# Patient Record
Sex: MALE | Race: WHITE | NOT HISPANIC OR LATINO | Employment: OTHER | URBAN - METROPOLITAN AREA
[De-identification: names, ages, dates, MRNs, and addresses within clinical notes are randomized per-mention and may not be internally consistent; named-entity substitution may affect disease eponyms.]

---

## 2017-01-14 ENCOUNTER — OFFICE VISIT (OUTPATIENT)
Dept: FAMILY MEDICINE CLINIC | Facility: CLINIC | Age: 62
End: 2017-01-14

## 2017-01-14 VITALS
HEIGHT: 67 IN | HEART RATE: 82 BPM | TEMPERATURE: 99.3 F | RESPIRATION RATE: 18 BRPM | BODY MASS INDEX: 30.45 KG/M2 | SYSTOLIC BLOOD PRESSURE: 136 MMHG | DIASTOLIC BLOOD PRESSURE: 86 MMHG | WEIGHT: 194 LBS

## 2017-01-14 DIAGNOSIS — J40 BRONCHITIS: Primary | ICD-10-CM

## 2017-01-14 PROCEDURE — 99213 OFFICE O/P EST LOW 20 MIN: CPT | Performed by: FAMILY MEDICINE

## 2017-01-14 RX ORDER — AMOXICILLIN AND CLAVULANATE POTASSIUM 875; 125 MG/1; MG/1
1 TABLET, FILM COATED ORAL 2 TIMES DAILY
Qty: 20 TABLET | Refills: 0 | Status: SHIPPED | OUTPATIENT
Start: 2017-01-14 | End: 2017-01-24

## 2017-01-14 RX ORDER — BUDESONIDE AND FORMOTEROL FUMARATE DIHYDRATE 160; 4.5 UG/1; UG/1
2 AEROSOL RESPIRATORY (INHALATION)
Qty: 1 INHALER | Refills: 0 | Status: SHIPPED | OUTPATIENT
Start: 2017-01-14 | End: 2017-02-06

## 2017-01-14 RX ORDER — ALBUTEROL SULFATE 90 UG/1
2 AEROSOL, METERED RESPIRATORY (INHALATION) EVERY 4 HOURS PRN
Qty: 1 INHALER | Refills: 0 | Status: SHIPPED | OUTPATIENT
Start: 2017-01-14 | End: 2017-02-06

## 2017-01-14 NOTE — MR AVS SNAPSHOT
Mack Gamez   1/14/2017 8:50 AM   Office Visit    Provider:  Rizwan Lobato MD   Department:  [facility] FAMILY MEDICINE   Dept Phone:  [phone]                Your Full Care Plan              Today's Medication Changes          These changes are accurate as of: 1/14/17  9:23 AM.  If you have any questions, ask your nurse or doctor.               New Medication(s)Ordered:     albuterol 108 (90 BASE) MCG/ACT inhaler   Commonly known as:  PROVENTIL HFA;VENTOLIN HFA   Inhale 2 puffs Every 4 (Four) Hours As Needed for wheezing.   Started by:  Rizwan Lobato MD       amoxicillin-clavulanate 875-125 MG per tablet   Commonly known as:  AUGMENTIN   Take 1 tablet by mouth 2 (Two) Times a Day for 10 days.   Started by:  Rizwan Lobato MD       budesonide-formoterol 160-4.5 MCG/ACT inhaler   Commonly known as:  SYMBICORT   Inhale 2 puffs 2 (Two) Times a Day.   Started by:  Rizwan Lobato MD       Chlorcyclizine-Pseudoephed 25-60 MG tablet   Commonly known as:  STAHIST AD   Take 1/2 to 1 tab po every 12 hours PRN congestion   Started by:  Rizwan Lobato MD            Where to Get Your Medications      These medications were sent to [facility] - [place], [place] - [address] [place] - [phone]  - [phone] FX  [address] [place] [id number]    Hours:  24-hours Phone:  [phone]     albuterol 108 (90 BASE) MCG/ACT inhaler    amoxicillin-clavulanate 875-125 MG per tablet    budesonide-formoterol 160-4.5 MCG/ACT inhaler    Chlorcyclizine-Pseudoephed 25-60 MG tablet                  Your Updated Medication List          This list is accurate as of: 1/14/17  9:23 AM.  Always use your most recent med list.                albuterol 108 (90 BASE) MCG/ACT inhaler   Commonly known as:  PROVENTIL HFA;VENTOLIN HFA   Inhale 2 puffs Every 4 (Four) Hours As Needed for wheezing.       amLODIPine 10 MG tablet   Commonly known as:   NORVASC   Take 1 tablet by mouth Daily.       amoxicillin-clavulanate 875-125 MG per tablet   Commonly known as:  AUGMENTIN   Take 1 tablet by mouth 2 (Two) Times a Day for 10 days.       atorvastatin 80 MG tablet   Commonly known as:  LIPITOR   Take 1 tablet by mouth Every Night.       budesonide-formoterol 160-4.5 MCG/ACT inhaler   Commonly known as:  SYMBICORT   Inhale 2 puffs 2 (Two) Times a Day.       carvedilol 12.5 MG tablet   Commonly known as:  COREG   Take 1 tablet by mouth Every 12 (Twelve) Hours.       Chlorcyclizine-Pseudoephed 25-60 MG tablet   Commonly known as:  STAHIST AD   Take 1/2 to 1 tab po every 12 hours PRN congestion       clopidogrel 75 MG tablet   Commonly known as:  PLAVIX   Take 1 tablet by mouth Daily.       cyclobenzaprine 10 MG tablet   Commonly known as:  FLEXERIL   Take 1 tablet by mouth 3 (three) times a day as needed for muscle spasms.       furosemide 20 MG tablet   Commonly known as:  LASIX   Take 1 tablet by mouth Daily.       hydrOXYzine 25 MG tablet   Commonly known as:  ATARAX   take 1 tablet by mouth at bedtime if needed       ibuprofen 200 MG tablet   Commonly known as:  ADVIL,MOTRIN       lisinopril 40 MG tablet   Commonly known as:  PRINIVIL,ZESTRIL   take 1 tablet by mouth once daily       Morphine 60 MG 12 hr tablet   Commonly known as:  MS CONTIN       NUVIGIL 250 MG tablet   Generic drug:  Armodafinil       RA ASPIRIN EC 81 MG EC tablet   Generic drug:  aspirin   take 1 tablet by mouth once daily               You Were Diagnosed With        Codes Comments    Bronchitis    -  Primary ICD-10-CM: J40  ICD-9-CM: 490       Instructions     None    Patient Instructions History      MyChart Signup     Our records indicate that your [employer] account has been deactivated. If you would like to reactivate your account, please email [email or web address] or call [phone] to talk to our [employer] staff.             Other Info from Your Visit           Your  "Appointments     May 01, 2017  9:30 AM EDT   Follow Up with Paolo Moreno MD   [facility] CARDIOLOGY (--)    [address]  [facility] [address] [phone]           Arrive 15 minutes prior to appointment.              Allergies     No Known Drug Allergy        Vital Signs     Blood Pressure Pulse Temperature Respirations Height Weight    136/86 82 99.3 °F (37.4 °C) 18 67\" (170.2 cm) 194 lb (88 kg)    Body Mass Index Smoking Status                30.38 kg/m2 Never Smoker          Problems and Diagnoses Noted     Bronchitis    -  Primary      No Longer an Issue     Abnormal EKG    Hip pain    Chest pain at rest    Chronic neck pain    Coronary artery disease of native artery of native heart with stable angina pectoris    High cholesterol or triglycerides    High blood pressure    Difficulty falling or staying asleep    Primary narcolepsy without cataplexy    Proctitis    Shortness of breath at rest    Sleep apnea    Fast heart beat    Vitamin D deficiency      "

## 2017-01-14 NOTE — PROGRESS NOTES
Selene Gamez is a 61 y.o. male.     History of Present Illness   The patient describes several days of runny nose and congestion.  It seems to be not improving with home care.  The patient reports associated sinus drainage and scratchy throat.  The patient is now experiencing an intermittent croupy cough with occasional production.  There is no fever, chills or acute dyspnea.    Review of Systems   Constitutional: Negative for chills and fever.   HENT: Positive for congestion and postnasal drip. Negative for ear pain, facial swelling, sinus pressure and sore throat.    Eyes: Negative for discharge.   Respiratory: Positive for cough and wheezing. Negative for shortness of breath.    Cardiovascular: Negative for chest pain, palpitations and leg swelling.   Gastrointestinal: Negative for diarrhea, nausea and vomiting.   Skin: Negative for rash.   Hematological: Does not bruise/bleed easily.     Objective   Physical Exam   Constitutional: He is oriented to person, place, and time. He appears well-developed and well-nourished.   HENT:   Head: Normocephalic and atraumatic.   Right Ear: Hearing, tympanic membrane, external ear and ear canal normal.   Left Ear: Hearing, tympanic membrane, external ear and ear canal normal.   Nose: Mucosal edema and rhinorrhea present.   Mouth/Throat: Uvula is midline. Posterior oropharyngeal erythema present.   Eyes: Conjunctivae are normal. Left eye exhibits no discharge.   Neck: Neck supple.   Cardiovascular: Normal rate, regular rhythm and normal heart sounds.    Pulmonary/Chest: Effort normal. He has wheezes (bronchial).   Musculoskeletal: Normal range of motion.   Lymphadenopathy:     He has no cervical adenopathy.   Neurological: He is alert and oriented to person, place, and time.   Skin: Skin is warm. No rash noted.   Psychiatric: He has a normal mood and affect.   Vitals reviewed.    Assessment/Plan   Diagnoses and all orders for this visit:    Bronchitis  -      albuterol  (90 BASE) MCG/ACT inhaler; Inhale 2 puffs Every 4 (Four) Hours As Needed for wheezing.  -     budesonide-formoterol (SYMBICORT) 160-4.5 MCG/ACT inhaler; Inhale 2 puffs 2 (Two) Times a Day.  -     Chlorcyclizine-Pseudoephed (STAHIST AD) 25-60 MG tablet; Take 1/2 to 1 tab po every 12 hours PRN congestion  -     amoxicillin-clavulanate (AUGMENTIN) 875-125 MG per tablet; Take 1 tablet by mouth 2 (Two) Times a Day for 10 days.  - Rest, fluids, avoid sick contacts and RTC if not improved.

## 2017-01-29 ENCOUNTER — APPOINTMENT (OUTPATIENT)
Dept: GENERAL RADIOLOGY | Facility: HOSPITAL | Age: 62
End: 2017-01-29

## 2017-01-29 ENCOUNTER — APPOINTMENT (OUTPATIENT)
Dept: CT IMAGING | Facility: HOSPITAL | Age: 62
End: 2017-01-29

## 2017-01-29 ENCOUNTER — HOSPITAL ENCOUNTER (EMERGENCY)
Facility: HOSPITAL | Age: 62
Discharge: HOME | End: 2017-01-29
Attending: EMERGENCY MEDICINE | Admitting: EMERGENCY MEDICINE

## 2017-01-29 VITALS
RESPIRATION RATE: 18 BRPM | WEIGHT: 182 LBS | BODY MASS INDEX: 28.56 KG/M2 | HEIGHT: 67 IN | TEMPERATURE: 98.7 F | DIASTOLIC BLOOD PRESSURE: 101 MMHG | OXYGEN SATURATION: 98 % | HEART RATE: 87 BPM | SYSTOLIC BLOOD PRESSURE: 159 MMHG

## 2017-01-29 DIAGNOSIS — I10 UNCONTROLLED HYPERTENSION: ICD-10-CM

## 2017-01-29 DIAGNOSIS — I20.9 ANGINA PECTORIS (HCC): Primary | ICD-10-CM

## 2017-01-29 LAB
ALBUMIN SERPL-MCNC: 4.4 G/DL (ref 3.2–4.8)
ALBUMIN/GLOB SERPL: 1.6 G/DL (ref 1.5–2.5)
ALP SERPL-CCNC: 94 U/L (ref 25–100)
ALT SERPL W P-5'-P-CCNC: 32 U/L (ref 7–40)
ANION GAP SERPL CALCULATED.3IONS-SCNC: 6 MMOL/L (ref 3–11)
AST SERPL-CCNC: 24 U/L (ref 0–33)
BASOPHILS # BLD AUTO: 0 10*3/MM3 (ref 0–0.2)
BASOPHILS NFR BLD AUTO: 0 % (ref 0–1)
BILIRUB SERPL-MCNC: 1.9 MG/DL (ref 0.3–1.2)
BNP SERPL-MCNC: 28 PG/ML (ref 0–100)
BUN BLD-MCNC: 14 MG/DL (ref 9–23)
BUN/CREAT SERPL: 23.3 (ref 7–25)
CALCIUM SPEC-SCNC: 9.3 MG/DL (ref 8.7–10.4)
CHLORIDE SERPL-SCNC: 106 MMOL/L (ref 99–109)
CO2 SERPL-SCNC: 28 MMOL/L (ref 20–31)
CREAT BLD-MCNC: 0.6 MG/DL (ref 0.6–1.3)
DEPRECATED RDW RBC AUTO: 47.4 FL (ref 37–54)
EOSINOPHIL # BLD AUTO: 0.12 10*3/MM3 (ref 0.1–0.3)
EOSINOPHIL NFR BLD AUTO: 1.1 % (ref 0–3)
ERYTHROCYTE [DISTWIDTH] IN BLOOD BY AUTOMATED COUNT: 14.3 % (ref 11.3–14.5)
GFR SERPL CREATININE-BSD FRML MDRD: 138 ML/MIN/1.73
GLOBULIN UR ELPH-MCNC: 2.8 GM/DL
GLUCOSE BLD-MCNC: 118 MG/DL (ref 70–100)
HCT VFR BLD AUTO: 45.3 % (ref 38.9–50.9)
HGB BLD-MCNC: 15.5 G/DL (ref 13.1–17.5)
HOLD SPECIMEN: NORMAL
HOLD SPECIMEN: NORMAL
IMM GRANULOCYTES # BLD: 0.03 10*3/MM3 (ref 0–0.03)
IMM GRANULOCYTES NFR BLD: 0.3 % (ref 0–0.6)
LIPASE SERPL-CCNC: 24 U/L (ref 6–51)
LYMPHOCYTES # BLD AUTO: 0.99 10*3/MM3 (ref 0.6–4.8)
LYMPHOCYTES NFR BLD AUTO: 9.2 % (ref 24–44)
MCH RBC QN AUTO: 31 PG (ref 27–31)
MCHC RBC AUTO-ENTMCNC: 34.2 G/DL (ref 32–36)
MCV RBC AUTO: 90.6 FL (ref 80–99)
MONOCYTES # BLD AUTO: 0.75 10*3/MM3 (ref 0–1)
MONOCYTES NFR BLD AUTO: 7 % (ref 0–12)
NEUTROPHILS # BLD AUTO: 8.9 10*3/MM3 (ref 1.5–8.3)
NEUTROPHILS NFR BLD AUTO: 82.4 % (ref 41–71)
PLATELET # BLD AUTO: 215 10*3/MM3 (ref 150–450)
PMV BLD AUTO: 9.9 FL (ref 6–12)
POTASSIUM BLD-SCNC: 3.9 MMOL/L (ref 3.5–5.5)
PROT SERPL-MCNC: 7.2 G/DL (ref 5.7–8.2)
RBC # BLD AUTO: 5 10*6/MM3 (ref 4.2–5.76)
SODIUM BLD-SCNC: 140 MMOL/L (ref 132–146)
TROPONIN I SERPL-MCNC: 0 NG/ML (ref 0–0.07)
WBC NRBC COR # BLD: 10.79 10*3/MM3 (ref 3.5–10.8)
WHOLE BLOOD HOLD SPECIMEN: NORMAL
WHOLE BLOOD HOLD SPECIMEN: NORMAL

## 2017-01-29 PROCEDURE — 71275 CT ANGIOGRAPHY CHEST: CPT

## 2017-01-29 PROCEDURE — 83690 ASSAY OF LIPASE: CPT | Performed by: EMERGENCY MEDICINE

## 2017-01-29 PROCEDURE — 93005 ELECTROCARDIOGRAM TRACING: CPT

## 2017-01-29 PROCEDURE — 80053 COMPREHEN METABOLIC PANEL: CPT | Performed by: EMERGENCY MEDICINE

## 2017-01-29 PROCEDURE — 71010 HC CHEST PA OR AP: CPT

## 2017-01-29 PROCEDURE — 0 IOPAMIDOL PER 1 ML: Performed by: EMERGENCY MEDICINE

## 2017-01-29 PROCEDURE — 83880 ASSAY OF NATRIURETIC PEPTIDE: CPT | Performed by: EMERGENCY MEDICINE

## 2017-01-29 PROCEDURE — 85025 COMPLETE CBC W/AUTO DIFF WBC: CPT | Performed by: EMERGENCY MEDICINE

## 2017-01-29 PROCEDURE — 99284 EMERGENCY DEPT VISIT MOD MDM: CPT

## 2017-01-29 PROCEDURE — 84484 ASSAY OF TROPONIN QUANT: CPT

## 2017-01-29 RX ORDER — ASPIRIN 81 MG/1
324 TABLET, CHEWABLE ORAL ONCE
Status: COMPLETED | OUTPATIENT
Start: 2017-01-29 | End: 2017-01-29

## 2017-01-29 RX ORDER — CARVEDILOL 25 MG/1
25 TABLET ORAL EVERY 12 HOURS SCHEDULED
Qty: 60 TABLET | Refills: 1 | Status: SHIPPED | OUTPATIENT
Start: 2017-01-29 | End: 2017-02-06

## 2017-01-29 RX ORDER — ISOSORBIDE MONONITRATE 30 MG/1
30 TABLET, EXTENDED RELEASE ORAL DAILY
Qty: 30 TABLET | Refills: 0 | Status: SHIPPED | OUTPATIENT
Start: 2017-01-29 | End: 2017-02-06

## 2017-01-29 RX ORDER — SODIUM CHLORIDE 0.9 % (FLUSH) 0.9 %
10 SYRINGE (ML) INJECTION AS NEEDED
Status: DISCONTINUED | OUTPATIENT
Start: 2017-01-29 | End: 2017-01-29 | Stop reason: HOSPADM

## 2017-01-29 RX ADMIN — IOPAMIDOL 70 ML: 755 INJECTION, SOLUTION INTRAVENOUS at 16:03

## 2017-01-29 RX ADMIN — NITROGLYCERIN 1 INCH: 20 OINTMENT TOPICAL at 14:43

## 2017-01-29 RX ADMIN — ASPIRIN 324 MG: 81 TABLET, CHEWABLE ORAL at 13:56

## 2017-01-30 ENCOUNTER — TELEPHONE (OUTPATIENT)
Dept: CARDIOLOGY | Facility: CLINIC | Age: 62
End: 2017-01-30

## 2017-01-30 NOTE — TELEPHONE ENCOUNTER
The pt called saying he had questions about recent admission for SOA.  LVM requesting return call so that I may assist him.

## 2017-01-31 ENCOUNTER — TELEPHONE (OUTPATIENT)
Dept: CARDIOLOGY | Facility: CLINIC | Age: 62
End: 2017-01-31

## 2017-01-31 RX ORDER — HYDROXYZINE HYDROCHLORIDE 25 MG/1
TABLET, FILM COATED ORAL
Qty: 30 TABLET | Refills: 0 | OUTPATIENT
Start: 2017-01-31

## 2017-01-31 NOTE — TELEPHONE ENCOUNTER
Patient called and explained that he is experiencing shortness of breath. He was seen in the ED 2 days prior for chest pain and hypertension. In the discharge summary it states for the patient to follow up with Dr. Moreno. Pt needs to be worked in for a sooner appointment. Melody POWELL Contacted via telephone and [facility] to move patient up on the schedule. Pt was contacted and told he would be getting a phone call to make a sooner appointment for him.

## 2017-02-02 ENCOUNTER — DOCUMENTATION (OUTPATIENT)
Dept: CARDIOLOGY | Facility: CLINIC | Age: 62
End: 2017-02-02

## 2017-02-02 NOTE — PROGRESS NOTES
Patient called and left a message regarding his medication making him feel over medicated.  I attempted to call him back and LVM for him to call back.

## 2017-02-03 RX ORDER — HYDROXYZINE HYDROCHLORIDE 25 MG/1
TABLET, FILM COATED ORAL
Qty: 30 TABLET | Refills: 0 | OUTPATIENT
Start: 2017-02-03

## 2017-02-06 ENCOUNTER — OFFICE VISIT (OUTPATIENT)
Dept: FAMILY MEDICINE CLINIC | Facility: CLINIC | Age: 62
End: 2017-02-06

## 2017-02-06 ENCOUNTER — OFFICE VISIT (OUTPATIENT)
Dept: CARDIOLOGY | Facility: CLINIC | Age: 62
End: 2017-02-06

## 2017-02-06 VITALS
TEMPERATURE: 98.6 F | HEART RATE: 100 BPM | HEIGHT: 67 IN | DIASTOLIC BLOOD PRESSURE: 96 MMHG | SYSTOLIC BLOOD PRESSURE: 150 MMHG | BODY MASS INDEX: 30.76 KG/M2 | WEIGHT: 196 LBS | OXYGEN SATURATION: 97 %

## 2017-02-06 VITALS
SYSTOLIC BLOOD PRESSURE: 119 MMHG | DIASTOLIC BLOOD PRESSURE: 90 MMHG | HEART RATE: 90 BPM | BODY MASS INDEX: 30.83 KG/M2 | WEIGHT: 196.4 LBS | HEIGHT: 67 IN

## 2017-02-06 DIAGNOSIS — I10 ESSENTIAL HYPERTENSION: ICD-10-CM

## 2017-02-06 DIAGNOSIS — F41.9 ANXIETY: Primary | ICD-10-CM

## 2017-02-06 DIAGNOSIS — I25.118 CORONARY ARTERY DISEASE OF NATIVE ARTERY OF NATIVE HEART WITH STABLE ANGINA PECTORIS (HCC): ICD-10-CM

## 2017-02-06 DIAGNOSIS — E78.2 MIXED HYPERLIPIDEMIA: Primary | ICD-10-CM

## 2017-02-06 PROBLEM — I25.10 CAD (CORONARY ARTERY DISEASE): Status: ACTIVE | Noted: 2017-02-06

## 2017-02-06 PROCEDURE — 99214 OFFICE O/P EST MOD 30 MIN: CPT | Performed by: INTERNAL MEDICINE

## 2017-02-06 PROCEDURE — 99213 OFFICE O/P EST LOW 20 MIN: CPT | Performed by: NURSE PRACTITIONER

## 2017-02-06 RX ORDER — AMLODIPINE BESYLATE 10 MG/1
10 TABLET ORAL DAILY
Qty: 30 TABLET | Refills: 11 | Status: SHIPPED | OUTPATIENT
Start: 2017-02-06 | End: 2017-04-10

## 2017-02-06 RX ORDER — HYDROXYZINE HYDROCHLORIDE 25 MG/1
25 TABLET, FILM COATED ORAL EVERY 6 HOURS PRN
Qty: 30 TABLET | Refills: 5 | Status: SHIPPED | OUTPATIENT
Start: 2017-02-06 | End: 2017-04-10

## 2017-02-06 RX ORDER — NITROGLYCERIN 0.4 MG/1
0.4 TABLET SUBLINGUAL AS NEEDED
Refills: 0 | COMMUNITY
Start: 2017-01-30 | End: 2017-04-13 | Stop reason: SDUPTHER

## 2017-02-06 NOTE — PROGRESS NOTES
[facility] AT [address], [address]  [place], [place], [address] [phone]  [email or web address]           OUTPATIENT CLINIC FOLLOW UP NOTE    Encounter Date:02/06/2017    Patient Care Team:  Patient Care Team:  Sepideh Cruz DNP, APRN as PCP - General    Subjective:   Reason for consultation:   Chief Complaint   Patient presents with   • Follow-up     HTN/CAD       HPI:    Mack Gamez is a 61 y.o. male.  History of Present Illness  The patient has a history of dilated coronary arteries thought to be due to Kawasaki disease.  He had an episode of severe chest pain and ischemia noted on a stress test in September 2016.  He was found to have a  of a large sized RPL B; attempt at revascularization was unsuccessful, but due to it being a distal vessel CABG was not recommended. He presents for follow-up.    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  He does occasionally have sudden onset dyspnea, resolves with rest.  Also has felt nauseous with some pills and has self discontinued multiple medications.  Continues to take MS Contin and ibuprofen for pain as well as well as Flexeril.    PFSH:  Patient Active Problem List   Diagnosis   • Hyperlipidemia   • HTN (hypertension)   • CAD (coronary artery disease)         Current Outpatient Prescriptions:   •  amLODIPine (NORVASC) 10 MG tablet, Take 1 tablet by mouth Daily., Disp: 30 tablet, Rfl: 11  •  Armodafinil (NUVIGIL) 250 MG tablet, Take 250 mg by mouth every morning., Disp: , Rfl:   •  atorvastatin (LIPITOR) 80 MG tablet, Take 1 tablet by mouth Every Night., Disp: 30 tablet, Rfl: 11  •  hydrOXYzine (ATARAX) 25 MG tablet, take 1 tablet by mouth at bedtime if needed, Disp: 30 tablet, Rfl: 0  •  ibuprofen (ADVIL,MOTRIN) 200 MG tablet, Take 200 mg by mouth every 6 (six) hours as needed for mild pain  "(1-3)., Disp: , Rfl:   •  lisinopril (PRINIVIL,ZESTRIL) 40 MG tablet, take 1 tablet by mouth once daily, Disp: 30 tablet, Rfl: 5  •  morphine (MS CONTIN) 60 MG 12 hr tablet, Take 60 mg by mouth every 8 (eight) hours., Disp: , Rfl:   •  RA ASPIRIN EC 81 MG EC tablet, take 1 tablet by mouth once daily, Disp: 30 tablet, Rfl: 5    Allergies   Allergen Reactions   • No Known Drug Allergy         reports that he has never smoked. He has never used smokeless tobacco.    Family History   Problem Relation Age of Onset   • No Known Problems Mother    • Heart attack Father    • Coronary artery disease Other    • Stroke Other    • No Known Problems Sister    • No Known Problems Brother    • No Known Problems Daughter    • No Known Problems Son        Review of Systems:  Positive for stomach upset, dyspnea  Negative for exertional chest pain, orthopnea, PND, lower extremity edema, palpitations, lightheadedness, syncope.   Review of Systems  All other systems reviewed and are negative.    Objective:   Blood pressure 119/90, pulse 90, height 67\" (170.2 cm), weight 196 lb 6.4 oz (89.1 kg).  Physical Exam  CONSTITUTIONAL: No acute distress, normal affect  RESPIRATORY: Normal effort. Clear to auscultation bilaterally without wheezing or rales  CARDIOVASCULAR: Jugular venous pressure within normal limits. Carotids with normal upstrokes without bruits.  Regular rate and rhythm with normal S1 and S2. Without murmur, gallop or rub.  PERIPHERAL VASCULAR: Normal radial pulses bilaterally. There is no lower extremity edema bilaterally.    Labs:  Lab Results   Component Value Date    ALT 32 01/29/2017    AST 24 01/29/2017     Lab Results   Component Value Date    CHOL 224 (H) 09/28/2016    TRIG 171 (H) 09/28/2016    HDL 52 09/28/2016    LDLDIRECT 144 (H) 09/28/2016    CREATININE 0.60 01/29/2017       Diagnostic Data:    Procedures    Assessment and Plan:   Mack was seen today for follow-up.    Diagnoses and all orders for this " visit:    Coronary artery disease of native artery of native heart with stable angina pectoris  -CCS 0  -Discontinue clopidogrel, continue aspirin  -Self discontinued beta blocker; will attempt to re add but will first help patient understand his current medications  -Also self discontinued Imdur  -For now: ASA, statin, amlodipine    Essential hypertension  -Patient confused on what meds to take; has been on amlodipine 10 and lisinopril 40. Continue this regimen for now  -Patient to take blood pressure once a day for next week and call with results.  If systolic pressure averaging above 140 or diastolic averaging above 90, re-add carvedilol at low-dose 6.25 mg twice a day    Polypharmacy  -Discontinued clopidogrel, carvedilol, Lasix, Imdur, his multiple inhalers  -Long discussion with patient about attempting to come off of his morphine (MS Contin) and limiting his use of ibuprofen and Flexeril    - Dietary and exercise counseling was provided during this visit  - Return for Follow up in 6-8 weeks.    Paolo Moreno MD, MSc, [facility]  Interventional Cardiology  [facility]

## 2017-02-06 NOTE — PROGRESS NOTES
Subjective   Mack Gamez is a 61 y.o. male.     History of Present Illness Patient is here for a refill of Atarax. No complaints. Saw Cardiology today and had medicine management.  He is now happy with his treatment plan. Has appt with pain management tomorrow. He would like to get off of Ms Contin.    The following portions of the patient's history were reviewed and updated as appropriate: allergies, current medications, past family history, past medical history, past social history, past surgical history and problem list.    Review of Systems   Constitutional: Negative for fatigue, fever and unexpected weight change.   HENT: Negative for congestion, hearing loss, nosebleeds, rhinorrhea, sore throat, trouble swallowing and voice change.    Eyes: Negative for pain, discharge, redness and visual disturbance.   Respiratory: Negative for cough, chest tightness, shortness of breath and wheezing.    Cardiovascular: Negative for chest pain, palpitations and leg swelling.   Gastrointestinal: Negative for abdominal distention, abdominal pain, anal bleeding, blood in stool, constipation, diarrhea, nausea and vomiting.   Endocrine: Negative for cold intolerance, heat intolerance, polydipsia, polyphagia and polyuria.   Genitourinary: Negative for dysuria, flank pain, frequency and hematuria.   Musculoskeletal: Negative for arthralgias, gait problem, joint swelling and myalgias.   Skin: Negative for color change and rash.   Neurological: Negative for dizziness, tremors, seizures, syncope, speech difficulty, weakness, numbness and headaches.   Hematological: Negative.    Psychiatric/Behavioral: The patient is nervous/anxious.        Objective   Physical Exam   Constitutional: He is oriented to person, place, and time. He appears well-developed and well-nourished. No distress.   HENT:   Head: Normocephalic and atraumatic.   Right Ear: External ear normal.   Left Ear: External ear normal.   Nose: Nose normal.    Mouth/Throat: Oropharynx is clear and moist. No oropharyngeal exudate.   Eyes: Conjunctivae are normal. Pupils are equal, round, and reactive to light. Right eye exhibits no discharge. Left eye exhibits no discharge. No scleral icterus.   Neck: Normal range of motion. Neck supple. No thyromegaly present.   Cardiovascular: Normal rate, regular rhythm, normal heart sounds and intact distal pulses.  Exam reveals no gallop and no friction rub.    No murmur heard.  Pulmonary/Chest: Effort normal and breath sounds normal. No respiratory distress. He has no wheezes. He has no rales.   Abdominal: Soft. Bowel sounds are normal. He exhibits no distension and no mass. There is no tenderness. There is no rebound and no guarding.   Musculoskeletal: Normal range of motion. He exhibits no edema or deformity.   Lymphadenopathy:     He has no cervical adenopathy.   Neurological: He is alert and oriented to person, place, and time. He has normal reflexes. He displays normal reflexes. No cranial nerve deficit. He exhibits normal muscle tone. Coordination normal.   Skin: Skin is warm and dry. No rash noted.   Psychiatric: He has a normal mood and affect. His behavior is normal. Judgment and thought content normal.   Nursing note and vitals reviewed.      Mack was seen today for med refill.    Diagnoses and all orders for this visit:    Anxiety  -     hydrOXYzine (ATARAX) 25 MG tablet; Take 1 tablet by mouth Every 6 (Six) Hours As Needed for anxiety.    Follow up for CPX and screening tests. Discussed the nature of the disease including, risks, complications, implications, management, safe and proper use of medications. Keep scheduled follow up appointments with me and any other providers. Encouraged patient to have appointment for complete physical, fasting labs, appropriate screenings, and immunizations on an annual basis.

## 2017-02-20 ENCOUNTER — HOSPITAL ENCOUNTER (EMERGENCY)
Facility: HOSPITAL | Age: 62
Discharge: HOME | End: 2017-02-20
Attending: EMERGENCY MEDICINE | Admitting: EMERGENCY MEDICINE

## 2017-02-20 ENCOUNTER — APPOINTMENT (OUTPATIENT)
Dept: GENERAL RADIOLOGY | Facility: HOSPITAL | Age: 62
End: 2017-02-20

## 2017-02-20 VITALS
SYSTOLIC BLOOD PRESSURE: 141 MMHG | WEIGHT: 186 LBS | TEMPERATURE: 98.6 F | DIASTOLIC BLOOD PRESSURE: 87 MMHG | RESPIRATION RATE: 14 BRPM | OXYGEN SATURATION: 97 % | BODY MASS INDEX: 29.19 KG/M2 | HEIGHT: 67 IN | HEART RATE: 80 BPM

## 2017-02-20 DIAGNOSIS — R06.02 SHORTNESS OF BREATH: Primary | ICD-10-CM

## 2017-02-20 LAB
ALBUMIN SERPL-MCNC: 4.2 G/DL (ref 3.2–4.8)
ALBUMIN/GLOB SERPL: 1.4 G/DL (ref 1.5–2.5)
ALP SERPL-CCNC: 81 U/L (ref 25–100)
ALT SERPL W P-5'-P-CCNC: 25 U/L (ref 7–40)
ANION GAP SERPL CALCULATED.3IONS-SCNC: 3 MMOL/L (ref 3–11)
AST SERPL-CCNC: 25 U/L (ref 0–33)
BASOPHILS # BLD AUTO: 0.02 10*3/MM3 (ref 0–0.2)
BASOPHILS NFR BLD AUTO: 0.3 % (ref 0–1)
BILIRUB SERPL-MCNC: 0.9 MG/DL (ref 0.3–1.2)
BNP SERPL-MCNC: 32 PG/ML (ref 0–100)
BNP SERPL-MCNC: 39 PG/ML (ref 0–100)
BUN BLD-MCNC: 12 MG/DL (ref 9–23)
BUN/CREAT SERPL: 24 (ref 7–25)
CALCIUM SPEC-SCNC: 9.4 MG/DL (ref 8.7–10.4)
CHLORIDE SERPL-SCNC: 106 MMOL/L (ref 99–109)
CO2 SERPL-SCNC: 29 MMOL/L (ref 20–31)
CREAT BLD-MCNC: 0.5 MG/DL (ref 0.6–1.3)
DEPRECATED RDW RBC AUTO: 48.4 FL (ref 37–54)
EOSINOPHIL # BLD AUTO: 0.12 10*3/MM3 (ref 0.1–0.3)
EOSINOPHIL NFR BLD AUTO: 1.9 % (ref 0–3)
ERYTHROCYTE [DISTWIDTH] IN BLOOD BY AUTOMATED COUNT: 14.4 % (ref 11.3–14.5)
FLUAV AG NPH QL: NEGATIVE
FLUBV AG NPH QL IA: NEGATIVE
GFR SERPL CREATININE-BSD FRML MDRD: >150 ML/MIN/1.73
GLOBULIN UR ELPH-MCNC: 3 GM/DL
GLUCOSE BLD-MCNC: 121 MG/DL (ref 70–100)
HCT VFR BLD AUTO: 46.3 % (ref 38.9–50.9)
HGB BLD-MCNC: 15.4 G/DL (ref 13.1–17.5)
HOLD SPECIMEN: NORMAL
HOLD SPECIMEN: NORMAL
IMM GRANULOCYTES # BLD: 0.02 10*3/MM3 (ref 0–0.03)
IMM GRANULOCYTES NFR BLD: 0.3 % (ref 0–0.6)
LIPASE SERPL-CCNC: 27 U/L (ref 6–51)
LYMPHOCYTES # BLD AUTO: 1.61 10*3/MM3 (ref 0.6–4.8)
LYMPHOCYTES NFR BLD AUTO: 25.5 % (ref 24–44)
MCH RBC QN AUTO: 30.2 PG (ref 27–31)
MCHC RBC AUTO-ENTMCNC: 33.3 G/DL (ref 32–36)
MCV RBC AUTO: 90.8 FL (ref 80–99)
MONOCYTES # BLD AUTO: 0.74 10*3/MM3 (ref 0–1)
MONOCYTES NFR BLD AUTO: 11.7 % (ref 0–12)
NEUTROPHILS # BLD AUTO: 3.81 10*3/MM3 (ref 1.5–8.3)
NEUTROPHILS NFR BLD AUTO: 60.3 % (ref 41–71)
PLATELET # BLD AUTO: 199 10*3/MM3 (ref 150–450)
PMV BLD AUTO: 10.1 FL (ref 6–12)
POTASSIUM BLD-SCNC: 3.8 MMOL/L (ref 3.5–5.5)
PROT SERPL-MCNC: 7.2 G/DL (ref 5.7–8.2)
RBC # BLD AUTO: 5.1 10*6/MM3 (ref 4.2–5.76)
SODIUM BLD-SCNC: 138 MMOL/L (ref 132–146)
TROPONIN I SERPL-MCNC: 0 NG/ML (ref 0–0.07)
TROPONIN I SERPL-MCNC: 0 NG/ML (ref 0–0.07)
WBC NRBC COR # BLD: 6.32 10*3/MM3 (ref 3.5–10.8)
WHOLE BLOOD HOLD SPECIMEN: NORMAL
WHOLE BLOOD HOLD SPECIMEN: NORMAL

## 2017-02-20 PROCEDURE — 99284 EMERGENCY DEPT VISIT MOD MDM: CPT

## 2017-02-20 PROCEDURE — 84484 ASSAY OF TROPONIN QUANT: CPT

## 2017-02-20 PROCEDURE — 71010 HC CHEST PA OR AP: CPT

## 2017-02-20 PROCEDURE — 93005 ELECTROCARDIOGRAM TRACING: CPT

## 2017-02-20 PROCEDURE — 83690 ASSAY OF LIPASE: CPT | Performed by: EMERGENCY MEDICINE

## 2017-02-20 PROCEDURE — 36415 COLL VENOUS BLD VENIPUNCTURE: CPT

## 2017-02-20 PROCEDURE — 87804 INFLUENZA ASSAY W/OPTIC: CPT | Performed by: PHYSICIAN ASSISTANT

## 2017-02-20 PROCEDURE — 80053 COMPREHEN METABOLIC PANEL: CPT | Performed by: EMERGENCY MEDICINE

## 2017-02-20 PROCEDURE — 83880 ASSAY OF NATRIURETIC PEPTIDE: CPT | Performed by: PHYSICIAN ASSISTANT

## 2017-02-20 PROCEDURE — 93005 ELECTROCARDIOGRAM TRACING: CPT | Performed by: EMERGENCY MEDICINE

## 2017-02-20 PROCEDURE — 83880 ASSAY OF NATRIURETIC PEPTIDE: CPT | Performed by: EMERGENCY MEDICINE

## 2017-02-20 PROCEDURE — 85025 COMPLETE CBC W/AUTO DIFF WBC: CPT | Performed by: EMERGENCY MEDICINE

## 2017-02-20 RX ORDER — SODIUM CHLORIDE 0.9 % (FLUSH) 0.9 %
10 SYRINGE (ML) INJECTION AS NEEDED
Status: DISCONTINUED | OUTPATIENT
Start: 2017-02-20 | End: 2017-02-20 | Stop reason: HOSPADM

## 2017-02-20 RX ORDER — ASPIRIN 81 MG/1
324 TABLET, CHEWABLE ORAL ONCE
Status: COMPLETED | OUTPATIENT
Start: 2017-02-20 | End: 2017-02-20

## 2017-02-20 RX ADMIN — ASPIRIN 81 MG 243 MG: 81 TABLET ORAL at 08:14

## 2017-02-20 NOTE — ED PROVIDER NOTES
Subjective   HPI Comments: 61-year-old male presents to the emergency department for evaluation of progressive shortness of breath is worse than usual last night.  The patient denies any fever or pain.  He has had a mild cough.  Past medical history of congestive heart failure, angina, cardiomegaly, hypertension, coronary artery disease, hyperlipidemia.  His PCP is Dr. Martinez.  His cardiologist is Dr. Moreno.  The patient states that he had a heart catheter in September 2016 that revealed some coronary artery disease with one of the arteries having 100% occlusion.  No stents were placed.  The patient was to be managed medically.  He denies any chest pain.  He's had no nausea, vomiting or diaphoresis.  The shortness of breath is worse with lying down and improves when he sits up.  He has had no lower extremity edema or pain.    Patient is a 61 y.o. male presenting with shortness of breath.   History provided by:  Patient  Shortness of Breath   Severity:  Severe  Onset quality:  Gradual  Duration: For several days but worse last night.  Timing:  Constant  Progression:  Waxing and waning  Chronicity:  New  Context comment:  Worse while supine  Relieved by:  Sitting up  Exacerbated by: Worse on lying down.  Ineffective treatments:  None tried  Associated symptoms: cough (mild, nonproductive) and neck pain (chronic right sided)    Associated symptoms: no abdominal pain, no chest pain, no ear pain, no fever, no headaches, no rash, no sore throat, no sputum production, no vomiting and no wheezing        Review of Systems   Constitutional: Negative for chills and fever.   HENT: Negative for congestion, ear pain, nosebleeds, rhinorrhea and sore throat.    Eyes: Negative for pain, discharge and visual disturbance.   Respiratory: Positive for cough (mild, nonproductive) and shortness of breath. Negative for sputum production, chest tightness and wheezing.    Cardiovascular: Negative for chest pain, palpitations and leg  swelling.   Gastrointestinal: Negative for abdominal pain, blood in stool, diarrhea, nausea and vomiting.   Endocrine: Negative.    Genitourinary: Negative for dysuria, hematuria and urgency.   Musculoskeletal: Positive for myalgias (chronic right sided neck and shoulder pain) and neck pain (chronic right sided). Negative for arthralgias and back pain.   Skin: Negative for pallor and rash.   Allergic/Immunologic: Negative for immunocompromised state.   Neurological: Negative for dizziness, speech difficulty, weakness and headaches.   Hematological: Negative for adenopathy. Does not bruise/bleed easily.   Psychiatric/Behavioral: Negative.        Past Medical History   Diagnosis Date   • Abnormal electrocardiogram 6/23/2016   • Arthralgia of hip 6/23/2016   • CAD (coronary artery disease)    • Hyperlipidemia 6/23/2016   • Hypertension    • Insomnia 6/23/2016   • Kawasaki's disease    • Neck pain    • Orchitis and epididymitis    • Proctitis 6/23/2016   • Rheumatic fever    • Right foot pain    • Sleep apnea 6/23/2016   • Tachycardia 6/23/2016   • Vitamin D deficiency 6/23/2016       Allergies   Allergen Reactions   • No Known Drug Allergy        Past Surgical History   Procedure Laterality Date   • Knee surgery     • Hand surgery     • Nasal septum surgery     • Colonoscopy     • Cardiac catheterization N/A 9/28/2016     Procedure: Left Heart Cath;  Surgeon: Paolo Moreno MD;  [facility];  Service:        Family History   Problem Relation Age of Onset   • No Known Problems Mother    • Heart attack Father    • Coronary artery disease Other    • Stroke Other    • No Known Problems Sister    • No Known Problems Brother    • No Known Problems Daughter    • No Known Problems Son        Social History     Social History   • Marital status: Single     Spouse name: N/A   • Number of children: N/A   • Years of education: N/A     Occupational History   • self employed      Social History Main  Topics   • Smoking status: Never Smoker   • Smokeless tobacco: Never Used   • Alcohol use Yes      Comment: Seldom   • Drug use: No   • Sexual activity: Yes     Partners: Female     Other Topics Concern   • None     Social History Narrative           Objective   Physical Exam   Constitutional: He is oriented to person, place, and time. He appears well-developed and well-nourished. No distress.   HENT:   Head: Normocephalic and atraumatic.   Nose: Nose normal.   Mouth/Throat: Oropharynx is clear and moist.   Eyes: EOM are normal. Pupils are equal, round, and reactive to light. Left eye exhibits no discharge. No scleral icterus.   Neck: Normal range of motion. Neck supple.   Cardiovascular: Normal rate, regular rhythm and normal heart sounds.    No murmur heard.  Pulmonary/Chest: Effort normal and breath sounds normal. No respiratory distress. He has no wheezes. He has no rales. He exhibits no tenderness.   Abdominal: Soft. Bowel sounds are normal. There is no tenderness.   Musculoskeletal: Normal range of motion. He exhibits no edema or tenderness.   Neurological: He is alert and oriented to person, place, and time.   Skin: Skin is warm and dry. No rash noted. He is not diaphoretic.   Psychiatric: He has a normal mood and affect.   Nursing note and vitals reviewed.      Procedures         ED Course  ED Course    The pt is resting comfortably here.  No acute EKG changes.  Negative troponins x2.  No acute findings on chest xray.  BNP is only 39.  Neg influenza screen.  Sats are in upper 90s on RA.  No pleuritic pain.  I reviewed his prior records and see that he had a cath in 9/2016 (Dr. Moreno) with some diffuse atherosclerotic dz.  His RCA in particular had some aneurysmal segments and varying degrees of stenosis, with one segment that was 100% occluded.  No stents were placed and he was to be managed medically.  The pt is anxious to leave.  I discussed his workup and asked him to give me time to speak with Dr. Moreno if  he was available.  The pt agrees.  1:56 PM  I spoke with Dr. Moreno, who reviewed the heart cath and has some recollection of pt.  He states pt had a decent EF and from his cath standpoint, medical management was all that was indicated at present.  We discussed his labs and chest xray and he felt pt could go home to f/u in office.  Pt agreeable to plan.  He was advised to return to the emergency department if his symptoms worsened or if he develops pain.                [facility]    Final diagnoses:   Shortness of breath            AMANDA Paez  02/20/17 [id number]

## 2017-02-20 NOTE — DISCHARGE INSTRUCTIONS
Rest.  Continue current medicines.  Call Dr. Moreno's office for follow-up appointment.  Return to the emergency department if you have worsening shortness of breath, pain, fever or other concerns.

## 2017-02-22 RX ORDER — LISINOPRIL 40 MG/1
TABLET ORAL
Qty: 30 TABLET | Refills: 5 | OUTPATIENT
Start: 2017-02-22

## 2017-02-23 RX ORDER — CARVEDILOL 6.25 MG/1
6.25 TABLET ORAL 2 TIMES DAILY
Qty: 60 TABLET | Refills: 0 | Status: SHIPPED | OUTPATIENT
Start: 2017-02-23 | End: 2017-03-21 | Stop reason: SDUPTHER

## 2017-02-23 NOTE — TELEPHONE ENCOUNTER
Pt called reporting a BP log with average of 152/92, HR was not logged.  I asked that he restart his carvedilol per MJS note, and keep another log for one week with HR.  He verbalized understanding and will call back with this log.

## 2017-02-24 RX ORDER — CARVEDILOL 6.25 MG/1
TABLET ORAL
Qty: 60 TABLET | Refills: 0 | OUTPATIENT
Start: 2017-02-24

## 2017-02-24 RX ORDER — LISINOPRIL 40 MG/1
TABLET ORAL
Qty: 30 TABLET | Refills: 5 | OUTPATIENT
Start: 2017-02-24

## 2017-03-19 ENCOUNTER — HOSPITAL ENCOUNTER (EMERGENCY)
Facility: HOSPITAL | Age: 62
Discharge: HOME | End: 2017-03-19
Attending: EMERGENCY MEDICINE | Admitting: EMERGENCY MEDICINE

## 2017-03-19 VITALS
BODY MASS INDEX: 29.03 KG/M2 | TEMPERATURE: 98.6 F | HEART RATE: 77 BPM | OXYGEN SATURATION: 94 % | HEIGHT: 67 IN | RESPIRATION RATE: 18 BRPM | WEIGHT: 185 LBS | SYSTOLIC BLOOD PRESSURE: 133 MMHG | DIASTOLIC BLOOD PRESSURE: 96 MMHG

## 2017-03-19 DIAGNOSIS — G89.21 CHRONIC PAIN DUE TO TRAUMA: Primary | ICD-10-CM

## 2017-03-19 PROCEDURE — 99283 EMERGENCY DEPT VISIT LOW MDM: CPT
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
## 2017-03-20 ENCOUNTER — TELEPHONE (OUTPATIENT)
Dept: FAMILY MEDICINE CLINIC | Facility: CLINIC | Age: 62
End: 2017-03-20

## 2017-03-20 NOTE — TELEPHONE ENCOUNTER
Pt has appt for 03/21/2017  ----- Message from Ariana Marquez sent at 3/20/2017  8:09 AM EDT -----  Contact: [phone]  PATIENT'S WIFE  [facility]ART IS REQUESTING A CALL BACK FROM NURSE TO DISCUSS A ER FOLLOW UP VISIT AND MEDICATIONS ISSUE

## 2017-03-21 ENCOUNTER — OFFICE VISIT (OUTPATIENT)
Dept: FAMILY MEDICINE CLINIC | Facility: CLINIC | Age: 62
End: 2017-03-21

## 2017-03-21 VITALS
BODY MASS INDEX: 29.66 KG/M2 | HEIGHT: 67 IN | WEIGHT: 189 LBS | OXYGEN SATURATION: 97 % | SYSTOLIC BLOOD PRESSURE: 160 MMHG | TEMPERATURE: 98.4 F | DIASTOLIC BLOOD PRESSURE: 82 MMHG | HEART RATE: 88 BPM

## 2017-03-21 DIAGNOSIS — M54.2 CHRONIC NECK PAIN: Primary | ICD-10-CM

## 2017-03-21 DIAGNOSIS — G89.29 CHRONIC NECK PAIN: Primary | ICD-10-CM

## 2017-03-21 DIAGNOSIS — R20.0 NUMBNESS AND TINGLING IN BOTH HANDS: ICD-10-CM

## 2017-03-21 DIAGNOSIS — R20.2 NUMBNESS AND TINGLING IN BOTH HANDS: ICD-10-CM

## 2017-03-21 PROCEDURE — 99214 OFFICE O/P EST MOD 30 MIN: CPT | Performed by: NURSE PRACTITIONER

## 2017-03-21 RX ORDER — CARVEDILOL 6.25 MG/1
TABLET ORAL
Qty: 60 TABLET | Refills: 11 | Status: SHIPPED | OUTPATIENT
Start: 2017-03-21 | End: 2017-04-10

## 2017-03-21 RX ORDER — LISINOPRIL 40 MG/1
TABLET ORAL
Qty: 30 TABLET | Refills: 2 | Status: SHIPPED | OUTPATIENT
Start: 2017-03-21 | End: 2017-04-13 | Stop reason: SDUPTHER

## 2017-03-21 RX ORDER — FUROSEMIDE 20 MG/1
20 TABLET ORAL DAILY
Refills: 1 | COMMUNITY
Start: 2017-02-24 | End: 2017-04-10

## 2017-03-21 RX ORDER — OXYCODONE AND ACETAMINOPHEN 10; 325 MG/1; MG/1
TABLET ORAL
Refills: 0 | COMMUNITY
Start: 2017-02-07 | End: 2017-03-21

## 2017-03-21 RX ORDER — CLOPIDOGREL BISULFATE 75 MG/1
75 TABLET ORAL DAILY
Refills: 1 | COMMUNITY
Start: 2017-02-24 | End: 2017-04-10
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
## 2017-03-24 ENCOUNTER — TELEPHONE (OUTPATIENT)
Dept: PAIN MEDICINE | Facility: CLINIC | Age: 62
End: 2017-03-24

## 2017-03-27 ENCOUNTER — TELEPHONE (OUTPATIENT)
Dept: FAMILY MEDICINE CLINIC | Facility: CLINIC | Age: 62
End: 2017-03-27
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
## 2017-03-27 NOTE — TELEPHONE ENCOUNTER
"----- Message from Rosetta Faust Rep sent at 3/27/2017 10:31 AM EDT -----  Regarding: PT INTERACTION  A woman called here regarding the pt and was very upset. She wanted to know why someone at our practice would not give and rx for the morphine pain killers for the pt. She then went on a tangent about the poor care he has gotten at other establishments and detail abut her personal life. She wanted to know why we wouldn't give him meds and if there are any dr's in the state of [place] that will bc she said she was a nurse in either [place], [place] or [place] and could not believe [place] would be like this when he has been on \"these morphine meds in [place] for years\"   I said I could ask someone else or my supervisor for recommendations for other clinics or for them to confirm that we do not usually give rx's for the meds she claims he needed, i did not release any Pt info     She never told me her relationship to the pt just that she needed to know why we did not give him what she thinks he needs  "

## 2017-03-30 ENCOUNTER — TELEPHONE (OUTPATIENT)
Dept: FAMILY MEDICINE CLINIC | Facility: CLINIC | Age: 62
End: 2017-03-30

## 2017-03-30 NOTE — TELEPHONE ENCOUNTER
"----- Message from Sepideh Cruz DNP, MECHE sent at 3/30/2017  1:12 PM EDT -----  Regarding: RE: QUESTION ABOUT PAIN MANAGMENT  Contact: [phone]  I have discussed this with Margarita Verde the  and she will contact Mr Gamez. I have done all I can do for him at this time.  ----- Message -----     From: Nazanin Stack     Sent: 3/30/2017  12:04 PM       To: Sepideh Cruz DNP, APRN, #  Subject: QUESTION ABOUT PAIN MANAGMENT                    (pt dismissal letter sent out today)    Pt called today and wants [facility] to call him back to discuss Pain management.  I advised him that we sent a referral to [facility] and they would send information to him about the appt.     He was not rude.  But started rambling about how he was going to call the  about how slow things move \"down here\".  And that these pain clinics are not doing what he thinks they should to meet his needs.    He would like [facility] to call him back. I advised him that this would probably not happen but I would forward the message.    Thanks!    "

## 2017-04-05 ENCOUNTER — TELEPHONE (OUTPATIENT)
Dept: FAMILY MEDICINE CLINIC | Facility: CLINIC | Age: 62
End: 2017-04-05

## 2017-04-05 DIAGNOSIS — G89.29 CHRONIC NECK PAIN: Primary | ICD-10-CM

## 2017-04-05 DIAGNOSIS — M54.2 CHRONIC NECK PAIN: Primary | ICD-10-CM

## 2017-04-05 DIAGNOSIS — M79.642 HAND PAIN, LEFT: ICD-10-CM

## 2017-04-05 NOTE — TELEPHONE ENCOUNTER
----- Message from Margarita Dee sent at 4/5/2017  2:43 PM EDT -----  Regarding: Referral Request   Contact: [phone]  Pt received the dismissal from practice letter today.  He's requesting a referral to first available Ortho for left hand pain and chronic neck pain.  Patient stated [facility] informed him it would be Aug before they could see him.  Is it possible to refer to someone sooner other than Dr. Tejeda? Made patient aware of the referral process and general wait times.      Patient requested a copy of the referral be mailed to his home address.   Thanks,  Margarita

## 2017-04-13 ENCOUNTER — TELEPHONE (OUTPATIENT)
Dept: CARDIOLOGY | Facility: CLINIC | Age: 62
End: 2017-04-13

## 2017-04-13 ENCOUNTER — TELEPHONE (OUTPATIENT)
Dept: FAMILY MEDICINE CLINIC | Facility: CLINIC | Age: 62
End: 2017-04-13

## 2017-04-13 RX ORDER — LISINOPRIL 40 MG/1
40 TABLET ORAL DAILY
Qty: 30 TABLET | Refills: 1 | Status: SHIPPED | OUTPATIENT
Start: 2017-04-13 | End: 2017-04-26 | Stop reason: SDUPTHER

## 2017-04-13 RX ORDER — LISINOPRIL 40 MG/1
40 TABLET ORAL DAILY
Qty: 30 TABLET | Refills: 0 | Status: SHIPPED | OUTPATIENT
Start: 2017-04-13 | End: 2017-04-13 | Stop reason: SDUPTHER

## 2017-04-13 RX ORDER — NITROGLYCERIN 0.4 MG/1
0.4 TABLET SUBLINGUAL AS NEEDED
Qty: 25 TABLET | Refills: 1 | Status: SHIPPED | OUTPATIENT
Start: 2017-04-13 | End: 2017-04-26 | Stop reason: SDUPTHER

## 2017-04-13 RX ORDER — NITROGLYCERIN 0.4 MG/1
0.4 TABLET SUBLINGUAL AS NEEDED
Qty: 25 TABLET | Refills: 0 | Status: SHIPPED | OUTPATIENT
Start: 2017-04-13 | End: 2017-04-13 | Stop reason: SDUPTHER

## 2017-04-13 RX ORDER — HYDROXYZINE HYDROCHLORIDE 25 MG/1
25 TABLET, FILM COATED ORAL EVERY 6 HOURS PRN
Qty: 60 TABLET | Refills: 0 | Status: ON HOLD | OUTPATIENT
Start: 2017-04-13 | End: 2017-06-11

## 2017-04-13 RX ORDER — CARVEDILOL 6.25 MG/1
6.25 TABLET ORAL 2 TIMES DAILY
Qty: 60 TABLET | Refills: 0 | Status: SHIPPED | OUTPATIENT
Start: 2017-04-13 | End: 2017-04-26 | Stop reason: SDUPTHER

## 2017-04-13 NOTE — TELEPHONE ENCOUNTER
After receiving a vm from Anasatsia, the patient's spouse I returned the call and spoke with the patient.  He states that he needs refills on his cardiac medications.  I advised that as he was not seen at his appt this week that I will send in 1 month's worth, and he will need to follow up for any further refills.  Understanding verbalized.

## 2017-04-24 RX ORDER — CARVEDILOL 6.25 MG/1
TABLET ORAL
Qty: 60 TABLET | Refills: 0 | OUTPATIENT
Start: 2017-04-24

## 2017-04-26 ENCOUNTER — OFFICE VISIT (OUTPATIENT)
Dept: CARDIOLOGY | Facility: CLINIC | Age: 62
End: 2017-04-26

## 2017-04-26 VITALS
DIASTOLIC BLOOD PRESSURE: 102 MMHG | SYSTOLIC BLOOD PRESSURE: 170 MMHG | HEIGHT: 67 IN | WEIGHT: 191 LBS | HEART RATE: 82 BPM | BODY MASS INDEX: 29.98 KG/M2

## 2017-04-26 DIAGNOSIS — I10 ESSENTIAL HYPERTENSION: ICD-10-CM

## 2017-04-26 DIAGNOSIS — I25.118 CORONARY ARTERY DISEASE OF NATIVE ARTERY OF NATIVE HEART WITH STABLE ANGINA PECTORIS (HCC): Primary | ICD-10-CM

## 2017-04-26 DIAGNOSIS — E78.2 MIXED HYPERLIPIDEMIA: ICD-10-CM

## 2017-04-26 PROCEDURE — 99214 OFFICE O/P EST MOD 30 MIN: CPT | Performed by: INTERNAL MEDICINE

## 2017-04-26 RX ORDER — CARVEDILOL 6.25 MG/1
6.25 TABLET ORAL 2 TIMES DAILY
Qty: 60 TABLET | Refills: 11 | Status: ON HOLD | OUTPATIENT
Start: 2017-04-26 | End: 2017-06-11

## 2017-04-26 RX ORDER — LISINOPRIL 40 MG/1
40 TABLET ORAL DAILY
Qty: 30 TABLET | Refills: 11 | Status: ON HOLD | OUTPATIENT
Start: 2017-04-26 | End: 2017-06-11

## 2017-04-26 RX ORDER — NITROGLYCERIN 0.4 MG/1
0.4 TABLET SUBLINGUAL AS NEEDED
Qty: 25 TABLET | Refills: 11 | Status: ON HOLD | OUTPATIENT
Start: 2017-04-26 | End: 2017-06-11

## 2017-04-26 RX ORDER — ATORVASTATIN CALCIUM 80 MG/1
80 TABLET, FILM COATED ORAL NIGHTLY
Status: ON HOLD | COMMUNITY
Start: 2017-04-25 | End: 2017-06-11

## 2017-04-26 RX ORDER — CLOPIDOGREL BISULFATE 75 MG/1
TABLET ORAL
COMMUNITY
Start: 2017-04-25 | End: 2017-04-26

## 2017-04-26 RX ORDER — FUROSEMIDE 20 MG/1
TABLET ORAL
COMMUNITY
Start: 2017-04-25 | End: 2017-04-26
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
## 2017-04-27 ENCOUNTER — HOSPITAL ENCOUNTER (EMERGENCY)
Facility: HOSPITAL | Age: 62
Discharge: HOME | End: 2017-04-27
Attending: EMERGENCY MEDICINE | Admitting: EMERGENCY MEDICINE

## 2017-04-27 ENCOUNTER — APPOINTMENT (OUTPATIENT)
Dept: CT IMAGING | Facility: HOSPITAL | Age: 62
End: 2017-04-27

## 2017-04-27 ENCOUNTER — APPOINTMENT (OUTPATIENT)
Dept: GENERAL RADIOLOGY | Facility: HOSPITAL | Age: 62
End: 2017-04-27

## 2017-04-27 VITALS
WEIGHT: 190 LBS | HEART RATE: 88 BPM | BODY MASS INDEX: 29.82 KG/M2 | SYSTOLIC BLOOD PRESSURE: 144 MMHG | HEIGHT: 67 IN | RESPIRATION RATE: 18 BRPM | DIASTOLIC BLOOD PRESSURE: 90 MMHG | TEMPERATURE: 97.7 F | OXYGEN SATURATION: 94 %

## 2017-04-27 DIAGNOSIS — F11.93 NARCOTIC WITHDRAWAL (HCC): ICD-10-CM

## 2017-04-27 DIAGNOSIS — I16.0 HYPERTENSIVE URGENCY: Primary | ICD-10-CM

## 2017-04-27 DIAGNOSIS — R11.2 NON-INTRACTABLE VOMITING WITH NAUSEA, UNSPECIFIED VOMITING TYPE: ICD-10-CM

## 2017-04-27 DIAGNOSIS — T59.91XA TOXIC EFFECT OF GAS EXPOSURE, ACCIDENTAL OR UNINTENTIONAL, INITIAL ENCOUNTER: ICD-10-CM

## 2017-04-27 LAB
ALBUMIN SERPL-MCNC: 4.6 G/DL (ref 3.2–4.8)
ALBUMIN/GLOB SERPL: 1.4 G/DL (ref 1.5–2.5)
ALP SERPL-CCNC: 86 U/L (ref 25–100)
ALT SERPL W P-5'-P-CCNC: 22 U/L (ref 7–40)
AMPHET+METHAMPHET UR QL: NEGATIVE
AMPHETAMINES UR QL: NEGATIVE
ANION GAP SERPL CALCULATED.3IONS-SCNC: 7 MMOL/L (ref 3–11)
AST SERPL-CCNC: 21 U/L (ref 0–33)
BACTERIA UR QL AUTO: ABNORMAL /HPF
BARBITURATES UR QL SCN: NEGATIVE
BASOPHILS # BLD AUTO: 0.03 10*3/MM3 (ref 0–0.2)
BASOPHILS NFR BLD AUTO: 0.2 % (ref 0–1)
BENZODIAZ UR QL SCN: NEGATIVE
BILIRUB SERPL-MCNC: 1.6 MG/DL (ref 0.3–1.2)
BILIRUB UR QL STRIP: NEGATIVE
BUN BLD-MCNC: 13 MG/DL (ref 9–23)
BUN/CREAT SERPL: 21.7 (ref 7–25)
BUPRENORPHINE SERPL-MCNC: NEGATIVE NG/ML
CALCIUM SPEC-SCNC: 9.8 MG/DL (ref 8.7–10.4)
CANNABINOIDS SERPL QL: NEGATIVE
CHLORIDE SERPL-SCNC: 103 MMOL/L (ref 99–109)
CLARITY UR: CLEAR
CO2 SERPL-SCNC: 27 MMOL/L (ref 20–31)
COCAINE UR QL: NEGATIVE
COLOR UR: YELLOW
CREAT BLD-MCNC: 0.6 MG/DL (ref 0.6–1.3)
DEPRECATED RDW RBC AUTO: 48.2 FL (ref 37–54)
EOSINOPHIL # BLD AUTO: 0.04 10*3/MM3 (ref 0.1–0.3)
EOSINOPHIL NFR BLD AUTO: 0.3 % (ref 0–3)
ERYTHROCYTE [DISTWIDTH] IN BLOOD BY AUTOMATED COUNT: 14.3 % (ref 11.3–14.5)
GFR SERPL CREATININE-BSD FRML MDRD: 137 ML/MIN/1.73
GLOBULIN UR ELPH-MCNC: 3.4 GM/DL
GLUCOSE BLD-MCNC: 122 MG/DL (ref 70–100)
GLUCOSE UR STRIP-MCNC: NEGATIVE MG/DL
HCT VFR BLD AUTO: 49.6 % (ref 38.9–50.9)
HGB BLD-MCNC: 16.5 G/DL (ref 13.1–17.5)
HGB UR QL STRIP.AUTO: NEGATIVE
HOLD SPECIMEN: NORMAL
HOLD SPECIMEN: NORMAL
HYALINE CASTS UR QL AUTO: ABNORMAL /LPF
IMM GRANULOCYTES # BLD: 0.04 10*3/MM3 (ref 0–0.03)
IMM GRANULOCYTES NFR BLD: 0.3 % (ref 0–0.6)
KETONES UR QL STRIP: ABNORMAL
LEUKOCYTE ESTERASE UR QL STRIP.AUTO: NEGATIVE
LIPASE SERPL-CCNC: 40 U/L (ref 6–51)
LYMPHOCYTES # BLD AUTO: 1.46 10*3/MM3 (ref 0.6–4.8)
LYMPHOCYTES NFR BLD AUTO: 9.8 % (ref 24–44)
MCH RBC QN AUTO: 30.3 PG (ref 27–31)
MCHC RBC AUTO-ENTMCNC: 33.3 G/DL (ref 32–36)
MCV RBC AUTO: 91.2 FL (ref 80–99)
METHADONE UR QL SCN: NEGATIVE
MONOCYTES # BLD AUTO: 0.93 10*3/MM3 (ref 0–1)
MONOCYTES NFR BLD AUTO: 6.2 % (ref 0–12)
NEUTROPHILS # BLD AUTO: 12.47 10*3/MM3 (ref 1.5–8.3)
NEUTROPHILS NFR BLD AUTO: 83.2 % (ref 41–71)
NITRITE UR QL STRIP: NEGATIVE
OPIATES UR QL: POSITIVE
OXYCODONE UR QL SCN: NEGATIVE
PCP UR QL SCN: NEGATIVE
PH UR STRIP.AUTO: 6 [PH] (ref 5–8)
PLATELET # BLD AUTO: 234 10*3/MM3 (ref 150–450)
PMV BLD AUTO: 10.3 FL (ref 6–12)
POTASSIUM BLD-SCNC: 3.7 MMOL/L (ref 3.5–5.5)
PROPOXYPH UR QL: NEGATIVE
PROT SERPL-MCNC: 8 G/DL (ref 5.7–8.2)
PROT UR QL STRIP: ABNORMAL
RBC # BLD AUTO: 5.44 10*6/MM3 (ref 4.2–5.76)
RBC # UR: ABNORMAL /HPF
REF LAB TEST METHOD: ABNORMAL
SODIUM BLD-SCNC: 137 MMOL/L (ref 132–146)
SP GR UR STRIP: 1.02 (ref 1–1.03)
SQUAMOUS #/AREA URNS HPF: ABNORMAL /HPF
TRICYCLICS UR QL SCN: NEGATIVE
TROPONIN I SERPL-MCNC: 0 NG/ML (ref 0–0.07)
TROPONIN I SERPL-MCNC: 0 NG/ML (ref 0–0.07)
UROBILINOGEN UR QL STRIP: ABNORMAL
WBC NRBC COR # BLD: 14.97 10*3/MM3 (ref 3.5–10.8)
WBC UR QL AUTO: ABNORMAL /HPF
WHOLE BLOOD HOLD SPECIMEN: NORMAL
WHOLE BLOOD HOLD SPECIMEN: NORMAL

## 2017-04-27 PROCEDURE — 25010000002 ONDANSETRON PER 1 MG: Performed by: EMERGENCY MEDICINE

## 2017-04-27 PROCEDURE — 80053 COMPREHEN METABOLIC PANEL: CPT | Performed by: EMERGENCY MEDICINE

## 2017-04-27 PROCEDURE — 93005 ELECTROCARDIOGRAM TRACING: CPT | Performed by: EMERGENCY MEDICINE

## 2017-04-27 PROCEDURE — 81001 URINALYSIS AUTO W/SCOPE: CPT | Performed by: EMERGENCY MEDICINE

## 2017-04-27 PROCEDURE — 93005 ELECTROCARDIOGRAM TRACING: CPT

## 2017-04-27 PROCEDURE — 74177 CT ABD & PELVIS W/CONTRAST: CPT

## 2017-04-27 PROCEDURE — 71010 HC CHEST PA OR AP: CPT

## 2017-04-27 PROCEDURE — 96375 TX/PRO/DX INJ NEW DRUG ADDON: CPT

## 2017-04-27 PROCEDURE — 83690 ASSAY OF LIPASE: CPT | Performed by: EMERGENCY MEDICINE

## 2017-04-27 PROCEDURE — 96374 THER/PROPH/DIAG INJ IV PUSH: CPT

## 2017-04-27 PROCEDURE — 0 IOPAMIDOL 61 % SOLUTION: Performed by: EMERGENCY MEDICINE

## 2017-04-27 PROCEDURE — 25010000002 HYDROMORPHONE PER 4 MG: Performed by: EMERGENCY MEDICINE

## 2017-04-27 PROCEDURE — 99285 EMERGENCY DEPT VISIT HI MDM: CPT

## 2017-04-27 PROCEDURE — 85025 COMPLETE CBC W/AUTO DIFF WBC: CPT | Performed by: EMERGENCY MEDICINE

## 2017-04-27 PROCEDURE — 80306 DRUG TEST PRSMV INSTRMNT: CPT | Performed by: EMERGENCY MEDICINE

## 2017-04-27 PROCEDURE — 84484 ASSAY OF TROPONIN QUANT: CPT

## 2017-04-27 PROCEDURE — 96361 HYDRATE IV INFUSION ADD-ON: CPT

## 2017-04-27 RX ORDER — SODIUM CHLORIDE 9 MG/ML
125 INJECTION, SOLUTION INTRAVENOUS CONTINUOUS
Status: DISCONTINUED | OUTPATIENT
Start: 2017-04-27 | End: 2017-04-27 | Stop reason: HOSPADM

## 2017-04-27 RX ORDER — HYDROMORPHONE HYDROCHLORIDE 1 MG/ML
0.5 INJECTION, SOLUTION INTRAMUSCULAR; INTRAVENOUS; SUBCUTANEOUS ONCE
Status: COMPLETED | OUTPATIENT
Start: 2017-04-27 | End: 2017-04-27

## 2017-04-27 RX ORDER — ONDANSETRON 2 MG/ML
4 INJECTION INTRAMUSCULAR; INTRAVENOUS ONCE
Status: COMPLETED | OUTPATIENT
Start: 2017-04-27 | End: 2017-04-27

## 2017-04-27 RX ORDER — FAMOTIDINE 10 MG/ML
20 INJECTION, SOLUTION INTRAVENOUS ONCE
Status: COMPLETED | OUTPATIENT
Start: 2017-04-27 | End: 2017-04-27

## 2017-04-27 RX ORDER — PROMETHAZINE HYDROCHLORIDE 25 MG/1
25 TABLET ORAL EVERY 8 HOURS PRN
Qty: 15 TABLET | Refills: 0 | Status: ON HOLD | OUTPATIENT
Start: 2017-04-27 | End: 2017-05-25

## 2017-04-27 RX ORDER — SODIUM CHLORIDE 0.9 % (FLUSH) 0.9 %
10 SYRINGE (ML) INJECTION AS NEEDED
Status: DISCONTINUED | OUTPATIENT
Start: 2017-04-27 | End: 2017-04-27 | Stop reason: HOSPADM

## 2017-04-27 RX ORDER — ONDANSETRON 4 MG/1
4 TABLET, ORALLY DISINTEGRATING ORAL EVERY 8 HOURS PRN
Qty: 15 TABLET | Refills: 0 | Status: ON HOLD | OUTPATIENT
Start: 2017-04-27 | End: 2017-05-25

## 2017-04-27 RX ORDER — CLONIDINE HYDROCHLORIDE 0.1 MG/1
0.1 TABLET ORAL ONCE
Status: COMPLETED | OUTPATIENT
Start: 2017-04-27 | End: 2017-04-27

## 2017-04-27 RX ORDER — CLONIDINE HYDROCHLORIDE 0.1 MG/1
0.1 TABLET ORAL 2 TIMES DAILY
Qty: 60 TABLET | Refills: 0 | Status: SHIPPED | OUTPATIENT
Start: 2017-04-27 | End: 2017-05-28 | Stop reason: HOSPADM

## 2017-04-27 RX ADMIN — SODIUM CHLORIDE 1000 ML: 9 INJECTION, SOLUTION INTRAVENOUS at 10:59

## 2017-04-27 RX ADMIN — CLONIDINE HYDROCHLORIDE 0.1 MG: 0.1 TABLET ORAL at 10:56

## 2017-04-27 RX ADMIN — SODIUM CHLORIDE 125 ML/HR: 9 INJECTION, SOLUTION INTRAVENOUS at 10:58

## 2017-04-27 RX ADMIN — ONDANSETRON 4 MG: 2 INJECTION INTRAMUSCULAR; INTRAVENOUS at 10:56

## 2017-04-27 RX ADMIN — IOPAMIDOL 99 ML: 612 INJECTION, SOLUTION INTRAVENOUS at 12:30

## 2017-04-27 RX ADMIN — HYDROMORPHONE HYDROCHLORIDE 0.5 MG: 1 INJECTION, SOLUTION INTRAMUSCULAR; INTRAVENOUS; SUBCUTANEOUS at 10:56

## 2017-04-27 RX ADMIN — FAMOTIDINE 20 MG: 10 INJECTION, SOLUTION INTRAVENOUS at 10:55

## 2017-05-24 ENCOUNTER — APPOINTMENT (OUTPATIENT)
Dept: GENERAL RADIOLOGY | Facility: HOSPITAL | Age: 62
End: 2017-05-24

## 2017-05-24 ENCOUNTER — APPOINTMENT (OUTPATIENT)
Dept: CT IMAGING | Facility: HOSPITAL | Age: 62
End: 2017-05-24

## 2017-05-24 ENCOUNTER — HOSPITAL ENCOUNTER (OUTPATIENT)
Facility: HOSPITAL | Age: 62
Setting detail: OBSERVATION
Discharge: HOME | End: 2017-05-28
Attending: EMERGENCY MEDICINE | Admitting: HOSPITALIST

## 2017-05-24 DIAGNOSIS — G93.40 ENCEPHALOPATHY: ICD-10-CM

## 2017-05-24 DIAGNOSIS — I16.0 HYPERTENSIVE URGENCY: ICD-10-CM

## 2017-05-24 DIAGNOSIS — Z74.09 IMPAIRED FUNCTIONAL MOBILITY, BALANCE, GAIT, AND ENDURANCE: ICD-10-CM

## 2017-05-24 DIAGNOSIS — Z78.9 IMPAIRED MOBILITY AND ADLS: ICD-10-CM

## 2017-05-24 DIAGNOSIS — Z74.09 IMPAIRED MOBILITY AND ADLS: ICD-10-CM

## 2017-05-24 DIAGNOSIS — I65.1 VERTEBROBASILAR DOLICHOECTASIA: ICD-10-CM

## 2017-05-24 DIAGNOSIS — I63.9 STROKE WITH CEREBRAL ISCHEMIA (HCC): ICD-10-CM

## 2017-05-24 DIAGNOSIS — R41.841 COGNITIVE COMMUNICATION DEFICIT: ICD-10-CM

## 2017-05-24 DIAGNOSIS — R41.82 ALTERED MENTAL STATUS, UNSPECIFIED ALTERED MENTAL STATUS TYPE: Primary | ICD-10-CM

## 2017-05-24 PROBLEM — I67.1 BRAIN ANEURYSM: Status: ACTIVE | Noted: 2017-05-24

## 2017-05-24 PROBLEM — F11.93 OPIATE WITHDRAWAL (HCC): Status: ACTIVE | Noted: 2017-05-24

## 2017-05-24 LAB
ALBUMIN SERPL-MCNC: 4.4 G/DL (ref 3.2–4.8)
ALBUMIN/GLOB SERPL: 1.4 G/DL (ref 1.5–2.5)
ALP SERPL-CCNC: 76 U/L (ref 25–100)
ALT SERPL W P-5'-P-CCNC: 18 U/L (ref 7–40)
AMPHET+METHAMPHET UR QL: NEGATIVE
AMPHETAMINES UR QL: NEGATIVE
ANION GAP SERPL CALCULATED.3IONS-SCNC: 8 MMOL/L (ref 3–11)
AST SERPL-CCNC: 21 U/L (ref 0–33)
BACTERIA UR QL AUTO: ABNORMAL /HPF
BARBITURATES UR QL SCN: NEGATIVE
BASOPHILS # BLD AUTO: 0.02 10*3/MM3 (ref 0–0.2)
BASOPHILS NFR BLD AUTO: 0.2 % (ref 0–1)
BENZODIAZ UR QL SCN: NEGATIVE
BILIRUB SERPL-MCNC: 1.6 MG/DL (ref 0.3–1.2)
BILIRUB UR QL STRIP: NEGATIVE
BUN BLD-MCNC: 13 MG/DL (ref 9–23)
BUN/CREAT SERPL: 16.3 (ref 7–25)
BUPRENORPHINE SERPL-MCNC: NEGATIVE NG/ML
CALCIUM SPEC-SCNC: 9.4 MG/DL (ref 8.7–10.4)
CANNABINOIDS SERPL QL: NEGATIVE
CHLORIDE SERPL-SCNC: 108 MMOL/L (ref 99–109)
CLARITY UR: CLEAR
CO2 SERPL-SCNC: 26 MMOL/L (ref 20–31)
COCAINE UR QL: NEGATIVE
COLOR UR: ABNORMAL
CREAT BLD-MCNC: 0.8 MG/DL (ref 0.6–1.3)
D-LACTATE SERPL-SCNC: 1.2 MMOL/L (ref 0.5–2)
DEPRECATED RDW RBC AUTO: 46 FL (ref 37–54)
EOSINOPHIL # BLD AUTO: 0.04 10*3/MM3 (ref 0.1–0.3)
EOSINOPHIL NFR BLD AUTO: 0.4 % (ref 0–3)
ERYTHROCYTE [DISTWIDTH] IN BLOOD BY AUTOMATED COUNT: 14 % (ref 11.3–14.5)
ETHANOL BLD-MCNC: <10 MG/DL (ref 0–10)
GFR SERPL CREATININE-BSD FRML MDRD: 99 ML/MIN/1.73
GLOBULIN UR ELPH-MCNC: 3.1 GM/DL
GLUCOSE BLD-MCNC: 160 MG/DL (ref 70–100)
GLUCOSE UR STRIP-MCNC: ABNORMAL MG/DL
HCT VFR BLD AUTO: 45.7 % (ref 38.9–50.9)
HGB BLD-MCNC: 15.1 G/DL (ref 13.1–17.5)
HGB UR QL STRIP.AUTO: NEGATIVE
HOLD SPECIMEN: NORMAL
HOLD SPECIMEN: NORMAL
HYALINE CASTS UR QL AUTO: ABNORMAL /LPF
IMM GRANULOCYTES # BLD: 0.02 10*3/MM3 (ref 0–0.03)
IMM GRANULOCYTES NFR BLD: 0.2 % (ref 0–0.6)
KETONES UR QL STRIP: NEGATIVE
LEUKOCYTE ESTERASE UR QL STRIP.AUTO: NEGATIVE
LYMPHOCYTES # BLD AUTO: 1.58 10*3/MM3 (ref 0.6–4.8)
LYMPHOCYTES NFR BLD AUTO: 17.7 % (ref 24–44)
MAGNESIUM SERPL-MCNC: 2.3 MG/DL (ref 1.3–2.7)
MCH RBC QN AUTO: 29.8 PG (ref 27–31)
MCHC RBC AUTO-ENTMCNC: 33 G/DL (ref 32–36)
MCV RBC AUTO: 90.3 FL (ref 80–99)
METHADONE UR QL SCN: NEGATIVE
MONOCYTES # BLD AUTO: 1.12 10*3/MM3 (ref 0–1)
MONOCYTES NFR BLD AUTO: 12.5 % (ref 0–12)
NEUTROPHILS # BLD AUTO: 6.16 10*3/MM3 (ref 1.5–8.3)
NEUTROPHILS NFR BLD AUTO: 69 % (ref 41–71)
NITRITE UR QL STRIP: NEGATIVE
OPIATES UR QL: NEGATIVE
OSMOLALITY SERPL: 297 MOSM/KG (ref 275–295)
OXYCODONE UR QL SCN: NEGATIVE
PCP UR QL SCN: NEGATIVE
PH UR STRIP.AUTO: 6.5 [PH] (ref 5–8)
PLATELET # BLD AUTO: 190 10*3/MM3 (ref 150–450)
PMV BLD AUTO: 10.2 FL (ref 6–12)
POTASSIUM BLD-SCNC: 3.4 MMOL/L (ref 3.5–5.5)
PROPOXYPH UR QL: NEGATIVE
PROT SERPL-MCNC: 7.5 G/DL (ref 5.7–8.2)
PROT UR QL STRIP: ABNORMAL
RBC # BLD AUTO: 5.06 10*6/MM3 (ref 4.2–5.76)
RBC # UR: ABNORMAL /HPF
REF LAB TEST METHOD: ABNORMAL
SODIUM BLD-SCNC: 142 MMOL/L (ref 132–146)
SP GR UR STRIP: 1.02 (ref 1–1.03)
SQUAMOUS #/AREA URNS HPF: ABNORMAL /HPF
TRICYCLICS UR QL SCN: NEGATIVE
TROPONIN I SERPL-MCNC: <0.006 NG/ML
UROBILINOGEN UR QL STRIP: ABNORMAL
WBC NRBC COR # BLD: 8.94 10*3/MM3 (ref 3.5–10.8)
WBC UR QL AUTO: ABNORMAL /HPF
WHOLE BLOOD HOLD SPECIMEN: NORMAL
WHOLE BLOOD HOLD SPECIMEN: NORMAL

## 2017-05-24 PROCEDURE — 71010 HC CHEST PA OR AP: CPT

## 2017-05-24 PROCEDURE — 80053 COMPREHEN METABOLIC PANEL: CPT | Performed by: EMERGENCY MEDICINE

## 2017-05-24 PROCEDURE — 93005 ELECTROCARDIOGRAM TRACING: CPT | Performed by: EMERGENCY MEDICINE

## 2017-05-24 PROCEDURE — 81001 URINALYSIS AUTO W/SCOPE: CPT | Performed by: EMERGENCY MEDICINE

## 2017-05-24 PROCEDURE — G0378 HOSPITAL OBSERVATION PER HR: HCPCS

## 2017-05-24 PROCEDURE — 99219 PR INITIAL OBSERVATION CARE/DAY 50 MINUTES: CPT | Performed by: INTERNAL MEDICINE

## 2017-05-24 PROCEDURE — 80307 DRUG TEST PRSMV CHEM ANLYZR: CPT | Performed by: EMERGENCY MEDICINE

## 2017-05-24 PROCEDURE — 83930 ASSAY OF BLOOD OSMOLALITY: CPT | Performed by: EMERGENCY MEDICINE

## 2017-05-24 PROCEDURE — 80306 DRUG TEST PRSMV INSTRMNT: CPT | Performed by: EMERGENCY MEDICINE

## 2017-05-24 PROCEDURE — 83735 ASSAY OF MAGNESIUM: CPT | Performed by: EMERGENCY MEDICINE

## 2017-05-24 PROCEDURE — 70450 CT HEAD/BRAIN W/O DYE: CPT

## 2017-05-24 PROCEDURE — 96365 THER/PROPH/DIAG IV INF INIT: CPT

## 2017-05-24 PROCEDURE — 84484 ASSAY OF TROPONIN QUANT: CPT | Performed by: EMERGENCY MEDICINE

## 2017-05-24 PROCEDURE — 96361 HYDRATE IV INFUSION ADD-ON: CPT

## 2017-05-24 PROCEDURE — 85025 COMPLETE CBC W/AUTO DIFF WBC: CPT | Performed by: EMERGENCY MEDICINE

## 2017-05-24 PROCEDURE — 99285 EMERGENCY DEPT VISIT HI MDM: CPT

## 2017-05-24 PROCEDURE — 87040 BLOOD CULTURE FOR BACTERIA: CPT | Performed by: EMERGENCY MEDICINE

## 2017-05-24 PROCEDURE — 83605 ASSAY OF LACTIC ACID: CPT | Performed by: EMERGENCY MEDICINE

## 2017-05-24 RX ORDER — HYDRALAZINE HYDROCHLORIDE 20 MG/ML
10 INJECTION INTRAMUSCULAR; INTRAVENOUS EVERY 8 HOURS PRN
Status: DISCONTINUED | OUTPATIENT
Start: 2017-05-24 | End: 2017-05-25

## 2017-05-24 RX ORDER — SODIUM CHLORIDE 0.9 % (FLUSH) 0.9 %
10 SYRINGE (ML) INJECTION AS NEEDED
Status: DISCONTINUED | OUTPATIENT
Start: 2017-05-24 | End: 2017-05-28 | Stop reason: HOSPADM

## 2017-05-24 RX ORDER — CLONIDINE HYDROCHLORIDE 0.1 MG/1
0.1 TABLET ORAL ONCE
Status: COMPLETED | OUTPATIENT
Start: 2017-05-24 | End: 2017-05-24

## 2017-05-24 RX ORDER — FUROSEMIDE 20 MG/1
20 TABLET ORAL DAILY
COMMUNITY
End: 2017-06-11 | Stop reason: HOSPADM

## 2017-05-24 RX ORDER — CLOPIDOGREL BISULFATE 75 MG/1
75 TABLET ORAL DAILY
Status: ON HOLD | COMMUNITY
End: 2017-06-11

## 2017-05-24 RX ORDER — AMLODIPINE BESYLATE 10 MG/1
10 TABLET ORAL DAILY
Status: ON HOLD | COMMUNITY
End: 2017-06-11

## 2017-05-24 RX ADMIN — SODIUM CHLORIDE 1000 ML: 9 INJECTION, SOLUTION INTRAVENOUS at 17:47

## 2017-05-24 RX ADMIN — NICARDIPINE HYDROCHLORIDE 5 MG/HR: 25 INJECTION INTRAVENOUS at 20:30

## 2017-05-24 RX ADMIN — CLONIDINE HYDROCHLORIDE 0.1 MG: 0.1 TABLET ORAL at 17:47

## 2017-05-25 ENCOUNTER — APPOINTMENT (OUTPATIENT)
Dept: MRI IMAGING | Facility: HOSPITAL | Age: 62
End: 2017-05-25
Attending: RADIOLOGY

## 2017-05-25 ENCOUNTER — APPOINTMENT (OUTPATIENT)
Dept: MRI IMAGING | Facility: HOSPITAL | Age: 62
End: 2017-05-25
Attending: INTERNAL MEDICINE

## 2017-05-25 LAB
ANION GAP SERPL CALCULATED.3IONS-SCNC: 9 MMOL/L (ref 3–11)
BUN BLD-MCNC: 12 MG/DL (ref 9–23)
BUN/CREAT SERPL: 20 (ref 7–25)
CALCIUM SPEC-SCNC: 9.3 MG/DL (ref 8.7–10.4)
CHLORIDE SERPL-SCNC: 106 MMOL/L (ref 99–109)
CO2 SERPL-SCNC: 26 MMOL/L (ref 20–31)
CREAT BLD-MCNC: 0.6 MG/DL (ref 0.6–1.3)
DEPRECATED RDW RBC AUTO: 48.3 FL (ref 37–54)
ERYTHROCYTE [DISTWIDTH] IN BLOOD BY AUTOMATED COUNT: 14.2 % (ref 11.3–14.5)
GFR SERPL CREATININE-BSD FRML MDRD: 137 ML/MIN/1.73
GLUCOSE BLD-MCNC: 122 MG/DL (ref 70–100)
GLUCOSE BLDC GLUCOMTR-MCNC: 141 MG/DL (ref 70–130)
GLUCOSE BLDC GLUCOMTR-MCNC: 91 MG/DL (ref 70–130)
HCT VFR BLD AUTO: 45.9 % (ref 38.9–50.9)
HGB BLD-MCNC: 15 G/DL (ref 13.1–17.5)
MCH RBC QN AUTO: 30.2 PG (ref 27–31)
MCHC RBC AUTO-ENTMCNC: 32.7 G/DL (ref 32–36)
MCV RBC AUTO: 92.4 FL (ref 80–99)
PLATELET # BLD AUTO: 201 10*3/MM3 (ref 150–450)
PMV BLD AUTO: 10.7 FL (ref 6–12)
POTASSIUM BLD-SCNC: 3.3 MMOL/L (ref 3.5–5.5)
RBC # BLD AUTO: 4.97 10*6/MM3 (ref 4.2–5.76)
SODIUM BLD-SCNC: 141 MMOL/L (ref 132–146)
WBC NRBC COR # BLD: 8.22 10*3/MM3 (ref 3.5–10.8)

## 2017-05-25 PROCEDURE — 99218 PR INITIAL OBSERVATION CARE/DAY 30 MINUTES: CPT | Performed by: RADIOLOGY

## 2017-05-25 PROCEDURE — 82962 GLUCOSE BLOOD TEST: CPT

## 2017-05-25 PROCEDURE — 99226 PR SBSQ OBSERVATION CARE/DAY 35 MINUTES: CPT | Performed by: INTERNAL MEDICINE

## 2017-05-25 PROCEDURE — G0378 HOSPITAL OBSERVATION PER HR: HCPCS

## 2017-05-25 PROCEDURE — 80048 BASIC METABOLIC PNL TOTAL CA: CPT | Performed by: INTERNAL MEDICINE

## 2017-05-25 PROCEDURE — 96366 THER/PROPH/DIAG IV INF ADDON: CPT

## 2017-05-25 PROCEDURE — 70551 MRI BRAIN STEM W/O DYE: CPT

## 2017-05-25 PROCEDURE — 85027 COMPLETE CBC AUTOMATED: CPT | Performed by: INTERNAL MEDICINE

## 2017-05-25 PROCEDURE — 70544 MR ANGIOGRAPHY HEAD W/O DYE: CPT

## 2017-05-25 RX ORDER — ASPIRIN 325 MG
325 TABLET ORAL DAILY
Status: DISCONTINUED | OUTPATIENT
Start: 2017-05-25 | End: 2017-05-28 | Stop reason: HOSPADM

## 2017-05-25 RX ORDER — HYDRALAZINE HYDROCHLORIDE 20 MG/ML
10 INJECTION INTRAMUSCULAR; INTRAVENOUS EVERY 4 HOURS PRN
Status: DISCONTINUED | OUTPATIENT
Start: 2017-05-25 | End: 2017-05-28 | Stop reason: HOSPADM

## 2017-05-25 RX ORDER — ASPIRIN 81 MG/1
81 TABLET ORAL DAILY
Status: DISCONTINUED | OUTPATIENT
Start: 2017-05-25 | End: 2017-05-25 | Stop reason: SDUPTHER

## 2017-05-25 RX ORDER — ATORVASTATIN CALCIUM 40 MG/1
80 TABLET, FILM COATED ORAL NIGHTLY
Status: DISCONTINUED | OUTPATIENT
Start: 2017-05-25 | End: 2017-05-28 | Stop reason: HOSPADM

## 2017-05-25 RX ORDER — POTASSIUM CHLORIDE 750 MG/1
40 CAPSULE, EXTENDED RELEASE ORAL AS NEEDED
Status: DISCONTINUED | OUTPATIENT
Start: 2017-05-25 | End: 2017-05-28 | Stop reason: HOSPADM

## 2017-05-25 RX ORDER — NITROGLYCERIN 0.4 MG/1
0.4 TABLET SUBLINGUAL AS NEEDED
Status: DISCONTINUED | OUTPATIENT
Start: 2017-05-25 | End: 2017-05-28 | Stop reason: HOSPADM

## 2017-05-25 RX ORDER — FUROSEMIDE 20 MG/1
20 TABLET ORAL DAILY
Status: DISCONTINUED | OUTPATIENT
Start: 2017-05-25 | End: 2017-05-28 | Stop reason: HOSPADM

## 2017-05-25 RX ORDER — ASPIRIN 300 MG/1
300 SUPPOSITORY RECTAL DAILY
Status: DISCONTINUED | OUTPATIENT
Start: 2017-05-25 | End: 2017-05-28 | Stop reason: HOSPADM

## 2017-05-25 RX ORDER — ATORVASTATIN CALCIUM 40 MG/1
80 TABLET, FILM COATED ORAL NIGHTLY
Status: DISCONTINUED | OUTPATIENT
Start: 2017-05-25 | End: 2017-05-25 | Stop reason: SDUPTHER

## 2017-05-25 RX ORDER — AMLODIPINE BESYLATE 10 MG/1
10 TABLET ORAL DAILY
Status: DISCONTINUED | OUTPATIENT
Start: 2017-05-25 | End: 2017-05-28 | Stop reason: HOSPADM

## 2017-05-25 RX ORDER — CLOPIDOGREL BISULFATE 75 MG/1
75 TABLET ORAL DAILY
Status: DISCONTINUED | OUTPATIENT
Start: 2017-05-26 | End: 2017-05-25 | Stop reason: SDUPTHER

## 2017-05-25 RX ORDER — POTASSIUM CHLORIDE 1.5 G/1.77G
40 POWDER, FOR SOLUTION ORAL AS NEEDED
Status: DISCONTINUED | OUTPATIENT
Start: 2017-05-25 | End: 2017-05-28 | Stop reason: HOSPADM

## 2017-05-25 RX ORDER — HYDROXYZINE HYDROCHLORIDE 25 MG/1
25 TABLET, FILM COATED ORAL EVERY 6 HOURS PRN
Status: DISCONTINUED | OUTPATIENT
Start: 2017-05-25 | End: 2017-05-28 | Stop reason: HOSPADM

## 2017-05-25 RX ORDER — CLONIDINE HYDROCHLORIDE 0.1 MG/1
0.1 TABLET ORAL 2 TIMES DAILY
Status: COMPLETED | OUTPATIENT
Start: 2017-05-25 | End: 2017-05-26

## 2017-05-25 RX ORDER — LISINOPRIL 20 MG/1
40 TABLET ORAL DAILY
Status: DISCONTINUED | OUTPATIENT
Start: 2017-05-25 | End: 2017-05-28 | Stop reason: HOSPADM

## 2017-05-25 RX ORDER — SODIUM CHLORIDE 0.9 % (FLUSH) 0.9 %
1-10 SYRINGE (ML) INJECTION AS NEEDED
Status: DISCONTINUED | OUTPATIENT
Start: 2017-05-25 | End: 2017-05-28 | Stop reason: HOSPADM

## 2017-05-25 RX ORDER — CLOPIDOGREL BISULFATE 75 MG/1
75 TABLET ORAL DAILY
Status: DISCONTINUED | OUTPATIENT
Start: 2017-05-25 | End: 2017-05-28 | Stop reason: HOSPADM

## 2017-05-25 RX ORDER — POTASSIUM CHLORIDE 7.45 MG/ML
10 INJECTION INTRAVENOUS
Status: DISCONTINUED | OUTPATIENT
Start: 2017-05-25 | End: 2017-05-28 | Stop reason: HOSPADM

## 2017-05-25 RX ORDER — CARVEDILOL 6.25 MG/1
6.25 TABLET ORAL 2 TIMES DAILY
Status: DISCONTINUED | OUTPATIENT
Start: 2017-05-25 | End: 2017-05-28 | Stop reason: HOSPADM

## 2017-05-25 RX ORDER — CLOPIDOGREL BISULFATE 75 MG/1
300 TABLET ORAL ONCE
Status: DISCONTINUED | OUTPATIENT
Start: 2017-05-25 | End: 2017-05-25

## 2017-05-25 RX ORDER — CLOPIDOGREL BISULFATE 75 MG/1
225 TABLET ORAL ONCE
Status: COMPLETED | OUTPATIENT
Start: 2017-05-25 | End: 2017-05-25

## 2017-05-25 RX ADMIN — ASPIRIN 81 MG: 81 TABLET, COATED ORAL at 09:48

## 2017-05-25 RX ADMIN — AMLODIPINE BESYLATE 10 MG: 10 TABLET ORAL at 09:48

## 2017-05-25 RX ADMIN — POTASSIUM CHLORIDE 40 MEQ: 750 CAPSULE, EXTENDED RELEASE ORAL at 17:54

## 2017-05-25 RX ADMIN — LISINOPRIL 40 MG: 20 TABLET ORAL at 09:50

## 2017-05-25 RX ADMIN — POTASSIUM CHLORIDE 40 MEQ: 750 CAPSULE, EXTENDED RELEASE ORAL at 22:02

## 2017-05-25 RX ADMIN — FUROSEMIDE 20 MG: 20 TABLET ORAL at 09:48

## 2017-05-25 RX ADMIN — CARVEDILOL 6.25 MG: 6.25 TABLET, FILM COATED ORAL at 17:54

## 2017-05-25 RX ADMIN — ATORVASTATIN CALCIUM 80 MG: 40 TABLET, FILM COATED ORAL at 22:01

## 2017-05-25 RX ADMIN — ASPIRIN 325 MG ORAL TABLET 325 MG: 325 PILL ORAL at 17:53

## 2017-05-25 RX ADMIN — CLOPIDOGREL BISULFATE 75 MG: 75 TABLET ORAL at 09:48

## 2017-05-25 RX ADMIN — CLONIDINE HYDROCHLORIDE 0.1 MG: 0.1 TABLET ORAL at 17:53

## 2017-05-25 RX ADMIN — CLOPIDOGREL BISULFATE 225 MG: 75 TABLET ORAL at 17:53

## 2017-05-25 RX ADMIN — CLONIDINE HYDROCHLORIDE 0.1 MG: 0.1 TABLET ORAL at 09:50

## 2017-05-25 RX ADMIN — CARVEDILOL 6.25 MG: 6.25 TABLET, FILM COATED ORAL at 09:50

## 2017-05-26 ENCOUNTER — APPOINTMENT (OUTPATIENT)
Dept: NEUROLOGY | Facility: HOSPITAL | Age: 62
End: 2017-05-26
Attending: PSYCHIATRY & NEUROLOGY

## 2017-05-26 ENCOUNTER — APPOINTMENT (OUTPATIENT)
Dept: GENERAL RADIOLOGY | Facility: HOSPITAL | Age: 62
End: 2017-05-26
Attending: PSYCHIATRY & NEUROLOGY

## 2017-05-26 LAB
APPEARANCE CSF: CLEAR
ARTICHOKE IGE QN: 72 MG/DL (ref 0–130)
CHOLEST SERPL-MCNC: 124 MG/DL (ref 0–200)
COLOR CSF: COLORLESS
COLOR SPUN CSF: COLORLESS
GLUCOSE BLDC GLUCOMTR-MCNC: 110 MG/DL (ref 70–130)
GLUCOSE CSF-MCNC: 70 MG/DL (ref 40–70)
HBA1C MFR BLD: 5.9 % (ref 4.8–5.6)
HDLC SERPL-MCNC: 40 MG/DL (ref 40–60)
PROT CSF-MCNC: 38 MG/DL (ref 15–45)
RBC # CSF MANUAL: 2 /MM3 (ref 0–0)
SPECIMEN VOL CSF: 6.5 ML
TRIGL SERPL-MCNC: 49 MG/DL (ref 0–150)
TUBE # CSF: 3
WBC # CSF MANUAL: 0 /MM3 (ref 0–5)

## 2017-05-26 PROCEDURE — G8988 SELF CARE GOAL STATUS: HCPCS

## 2017-05-26 PROCEDURE — C1769 GUIDE WIRE: HCPCS | Performed by: RADIOLOGY

## 2017-05-26 PROCEDURE — 36223 PLACE CATH CAROTID/INOM ART: CPT | Performed by: RADIOLOGY

## 2017-05-26 PROCEDURE — 82962 GLUCOSE BLOOD TEST: CPT

## 2017-05-26 PROCEDURE — 83036 HEMOGLOBIN GLYCOSYLATED A1C: CPT | Performed by: NURSE PRACTITIONER

## 2017-05-26 PROCEDURE — 97161 PT EVAL LOW COMPLEX 20 MIN: CPT

## 2017-05-26 PROCEDURE — G8989 SELF CARE D/C STATUS: HCPCS

## 2017-05-26 PROCEDURE — 99245 OFF/OP CONSLTJ NEW/EST HI 55: CPT | Performed by: PSYCHIATRY & NEUROLOGY

## 2017-05-26 PROCEDURE — 89050 BODY FLUID CELL COUNT: CPT | Performed by: PSYCHIATRY & NEUROLOGY

## 2017-05-26 PROCEDURE — C1760 CLOSURE DEV, VASC: HCPCS | Performed by: RADIOLOGY

## 2017-05-26 PROCEDURE — 25010000002 FENTANYL CITRATE (PF) 100 MCG/2ML SOLUTION: Performed by: RADIOLOGY

## 2017-05-26 PROCEDURE — 99225 PR SBSQ OBSERVATION CARE/DAY 25 MINUTES: CPT | Performed by: NURSE PRACTITIONER

## 2017-05-26 PROCEDURE — 84182 PROTEIN WESTERN BLOT TEST: CPT | Performed by: INTERNAL MEDICINE

## 2017-05-26 PROCEDURE — 97165 OT EVAL LOW COMPLEX 30 MIN: CPT

## 2017-05-26 PROCEDURE — 77003 FLUOROGUIDE FOR SPINE INJECT: CPT

## 2017-05-26 PROCEDURE — 0 IODIXANOL PER 1 ML: Performed by: RADIOLOGY

## 2017-05-26 PROCEDURE — 84157 ASSAY OF PROTEIN OTHER: CPT | Performed by: PSYCHIATRY & NEUROLOGY

## 2017-05-26 PROCEDURE — 87102 FUNGUS ISOLATION CULTURE: CPT | Performed by: PSYCHIATRY & NEUROLOGY

## 2017-05-26 PROCEDURE — G8987 SELF CARE CURRENT STATUS: HCPCS

## 2017-05-26 PROCEDURE — G0378 HOSPITAL OBSERVATION PER HR: HCPCS

## 2017-05-26 PROCEDURE — 82945 GLUCOSE OTHER FLUID: CPT | Performed by: PSYCHIATRY & NEUROLOGY

## 2017-05-26 PROCEDURE — 86317 IMMUNOASSAY INFECTIOUS AGENT: CPT | Performed by: INTERNAL MEDICINE

## 2017-05-26 PROCEDURE — C1894 INTRO/SHEATH, NON-LASER: HCPCS | Performed by: RADIOLOGY

## 2017-05-26 PROCEDURE — 86592 SYPHILIS TEST NON-TREP QUAL: CPT | Performed by: PSYCHIATRY & NEUROLOGY

## 2017-05-26 PROCEDURE — 95816 EEG AWAKE AND DROWSY: CPT

## 2017-05-26 PROCEDURE — 87015 SPECIMEN INFECT AGNT CONCNTJ: CPT | Performed by: PSYCHIATRY & NEUROLOGY

## 2017-05-26 PROCEDURE — 25010000002 MIDAZOLAM PER 1 MG: Performed by: RADIOLOGY

## 2017-05-26 PROCEDURE — 82164 ANGIOTENSIN I ENZYME TEST: CPT | Performed by: INTERNAL MEDICINE

## 2017-05-26 PROCEDURE — 87070 CULTURE OTHR SPECIMN AEROBIC: CPT | Performed by: PSYCHIATRY & NEUROLOGY

## 2017-05-26 PROCEDURE — 80061 LIPID PANEL: CPT | Performed by: NURSE PRACTITIONER

## 2017-05-26 PROCEDURE — 87327 CRYPTOCOCCUS NEOFORM AG IA: CPT | Performed by: PSYCHIATRY & NEUROLOGY

## 2017-05-26 PROCEDURE — 87205 SMEAR GRAM STAIN: CPT | Performed by: PSYCHIATRY & NEUROLOGY

## 2017-05-26 PROCEDURE — 36226 PLACE CATH VERTEBRAL ART: CPT | Performed by: RADIOLOGY

## 2017-05-26 RX ORDER — IODIXANOL 320 MG/ML
INJECTION, SOLUTION INTRAVASCULAR AS NEEDED
Status: DISCONTINUED | OUTPATIENT
Start: 2017-05-26 | End: 2017-05-26 | Stop reason: HOSPADM

## 2017-05-26 RX ORDER — SODIUM CHLORIDE 0.9 % (FLUSH) 0.9 %
1-10 SYRINGE (ML) INJECTION AS NEEDED
Status: DISCONTINUED | OUTPATIENT
Start: 2017-05-26 | End: 2017-05-28 | Stop reason: HOSPADM

## 2017-05-26 RX ORDER — LIDOCAINE HYDROCHLORIDE 10 MG/ML
INJECTION, SOLUTION INFILTRATION; PERINEURAL AS NEEDED
Status: DISCONTINUED | OUTPATIENT
Start: 2017-05-26 | End: 2017-05-26 | Stop reason: HOSPADM

## 2017-05-26 RX ORDER — SODIUM CHLORIDE 9 MG/ML
75 INJECTION, SOLUTION INTRAVENOUS CONTINUOUS
Status: ACTIVE | OUTPATIENT
Start: 2017-05-26 | End: 2017-05-26

## 2017-05-26 RX ORDER — HYDROCODONE BITARTRATE AND ACETAMINOPHEN 5; 325 MG/1; MG/1
1 TABLET ORAL EVERY 6 HOURS PRN
Status: DISCONTINUED | OUTPATIENT
Start: 2017-05-26 | End: 2017-05-28 | Stop reason: HOSPADM

## 2017-05-26 RX ORDER — HYDROCODONE BITARTRATE AND ACETAMINOPHEN 5; 325 MG/1; MG/1
1 TABLET ORAL ONCE
Status: COMPLETED | OUTPATIENT
Start: 2017-05-26 | End: 2017-05-26

## 2017-05-26 RX ORDER — LIDOCAINE HYDROCHLORIDE 10 MG/ML
5 INJECTION, SOLUTION INFILTRATION; PERINEURAL ONCE
Status: COMPLETED | OUTPATIENT
Start: 2017-05-26 | End: 2017-05-26

## 2017-05-26 RX ORDER — ACETAMINOPHEN 325 MG/1
650 TABLET ORAL EVERY 6 HOURS PRN
Status: DISCONTINUED | OUTPATIENT
Start: 2017-05-26 | End: 2017-05-28 | Stop reason: HOSPADM

## 2017-05-26 RX ORDER — SODIUM CHLORIDE 9 MG/ML
INJECTION, SOLUTION INTRAVENOUS CONTINUOUS PRN
Status: DISCONTINUED | OUTPATIENT
Start: 2017-05-26 | End: 2017-05-26 | Stop reason: HOSPADM

## 2017-05-26 RX ORDER — FENTANYL CITRATE 50 UG/ML
INJECTION, SOLUTION INTRAMUSCULAR; INTRAVENOUS AS NEEDED
Status: DISCONTINUED | OUTPATIENT
Start: 2017-05-26 | End: 2017-05-26 | Stop reason: HOSPADM

## 2017-05-26 RX ORDER — MIDAZOLAM HYDROCHLORIDE 1 MG/ML
INJECTION INTRAMUSCULAR; INTRAVENOUS AS NEEDED
Status: DISCONTINUED | OUTPATIENT
Start: 2017-05-26 | End: 2017-05-26 | Stop reason: HOSPADM

## 2017-05-26 RX ADMIN — ASPIRIN 325 MG ORAL TABLET 325 MG: 325 PILL ORAL at 09:54

## 2017-05-26 RX ADMIN — LISINOPRIL 40 MG: 20 TABLET ORAL at 09:48

## 2017-05-26 RX ADMIN — ATORVASTATIN CALCIUM 80 MG: 40 TABLET, FILM COATED ORAL at 20:10

## 2017-05-26 RX ADMIN — ACETAMINOPHEN 650 MG: 325 TABLET, FILM COATED ORAL at 22:46

## 2017-05-26 RX ADMIN — CLOPIDOGREL BISULFATE 75 MG: 75 TABLET ORAL at 09:54

## 2017-05-26 RX ADMIN — FUROSEMIDE 20 MG: 20 TABLET ORAL at 09:54

## 2017-05-26 RX ADMIN — CARVEDILOL 6.25 MG: 6.25 TABLET, FILM COATED ORAL at 09:48

## 2017-05-26 RX ADMIN — CLONIDINE HYDROCHLORIDE 0.1 MG: 0.1 TABLET ORAL at 17:33

## 2017-05-26 RX ADMIN — CARVEDILOL 6.25 MG: 6.25 TABLET, FILM COATED ORAL at 17:33

## 2017-05-26 RX ADMIN — CLONIDINE HYDROCHLORIDE 0.1 MG: 0.1 TABLET ORAL at 09:48

## 2017-05-26 RX ADMIN — AMLODIPINE BESYLATE 10 MG: 10 TABLET ORAL at 09:48

## 2017-05-26 RX ADMIN — HYDROCODONE BITARTRATE AND ACETAMINOPHEN 1 TABLET: 5; 325 TABLET ORAL at 16:06

## 2017-05-26 RX ADMIN — HYDROCODONE BITARTRATE AND ACETAMINOPHEN 1 TABLET: 5; 325 TABLET ORAL at 01:35

## 2017-05-26 RX ADMIN — ACETAMINOPHEN 650 MG: 325 TABLET, FILM COATED ORAL at 00:16

## 2017-05-26 RX ADMIN — HYDROCODONE BITARTRATE AND ACETAMINOPHEN 1 TABLET: 5; 325 TABLET ORAL at 20:10

## 2017-05-26 RX ADMIN — ACETAMINOPHEN 650 MG: 325 TABLET, FILM COATED ORAL at 12:37

## 2017-05-26 RX ADMIN — LIDOCAINE HYDROCHLORIDE 5 ML: 10 INJECTION, SOLUTION INFILTRATION; PERINEURAL at 14:52

## 2017-05-27 LAB — CRYPTOC AG TITR CSF: NEGATIVE {TITER}

## 2017-05-27 PROCEDURE — 25010000002 HYDRALAZINE PER 20 MG: Performed by: INTERNAL MEDICINE

## 2017-05-27 PROCEDURE — G0378 HOSPITAL OBSERVATION PER HR: HCPCS

## 2017-05-27 PROCEDURE — 99225 PR SBSQ OBSERVATION CARE/DAY 25 MINUTES: CPT | Performed by: INTERNAL MEDICINE

## 2017-05-27 RX ADMIN — CARVEDILOL 6.25 MG: 6.25 TABLET, FILM COATED ORAL at 17:24

## 2017-05-27 RX ADMIN — ASPIRIN 325 MG ORAL TABLET 325 MG: 325 PILL ORAL at 09:18

## 2017-05-27 RX ADMIN — ACETAMINOPHEN 650 MG: 325 TABLET, FILM COATED ORAL at 23:33

## 2017-05-27 RX ADMIN — HYDROCODONE BITARTRATE AND ACETAMINOPHEN 1 TABLET: 5; 325 TABLET ORAL at 09:18

## 2017-05-27 RX ADMIN — HYDRALAZINE HYDROCHLORIDE 10 MG: 20 INJECTION INTRAMUSCULAR; INTRAVENOUS at 20:13

## 2017-05-27 RX ADMIN — CLOPIDOGREL BISULFATE 75 MG: 75 TABLET ORAL at 09:18

## 2017-05-27 RX ADMIN — HYDROCODONE BITARTRATE AND ACETAMINOPHEN 1 TABLET: 5; 325 TABLET ORAL at 03:04

## 2017-05-27 RX ADMIN — HYDROCODONE BITARTRATE AND ACETAMINOPHEN 1 TABLET: 5; 325 TABLET ORAL at 15:39

## 2017-05-27 RX ADMIN — CARVEDILOL 6.25 MG: 6.25 TABLET, FILM COATED ORAL at 09:18

## 2017-05-27 RX ADMIN — HYDROCODONE BITARTRATE AND ACETAMINOPHEN 1 TABLET: 5; 325 TABLET ORAL at 20:54

## 2017-05-27 RX ADMIN — ATORVASTATIN CALCIUM 80 MG: 40 TABLET, FILM COATED ORAL at 20:08

## 2017-05-27 RX ADMIN — FUROSEMIDE 20 MG: 20 TABLET ORAL at 09:18

## 2017-05-27 RX ADMIN — LISINOPRIL 40 MG: 20 TABLET ORAL at 09:18

## 2017-05-27 RX ADMIN — AMLODIPINE BESYLATE 10 MG: 10 TABLET ORAL at 09:24

## 2017-05-28 VITALS
DIASTOLIC BLOOD PRESSURE: 97 MMHG | BODY MASS INDEX: 30.31 KG/M2 | HEART RATE: 95 BPM | TEMPERATURE: 98 F | OXYGEN SATURATION: 98 % | RESPIRATION RATE: 20 BRPM | SYSTOLIC BLOOD PRESSURE: 141 MMHG | HEIGHT: 67 IN | WEIGHT: 193.1 LBS

## 2017-05-28 PROCEDURE — G0378 HOSPITAL OBSERVATION PER HR: HCPCS

## 2017-05-28 PROCEDURE — 99217 PR OBSERVATION CARE DISCHARGE MANAGEMENT: CPT | Performed by: NURSE PRACTITIONER

## 2017-05-28 PROCEDURE — 92523 SPEECH SOUND LANG COMPREHEN: CPT

## 2017-05-28 RX ADMIN — LISINOPRIL 40 MG: 20 TABLET ORAL at 09:32

## 2017-05-28 RX ADMIN — ASPIRIN 325 MG ORAL TABLET 325 MG: 325 PILL ORAL at 09:33

## 2017-05-28 RX ADMIN — CARVEDILOL 6.25 MG: 6.25 TABLET, FILM COATED ORAL at 09:33

## 2017-05-28 RX ADMIN — HYDROCODONE BITARTRATE AND ACETAMINOPHEN 1 TABLET: 5; 325 TABLET ORAL at 03:30

## 2017-05-28 RX ADMIN — FUROSEMIDE 20 MG: 20 TABLET ORAL at 09:32

## 2017-05-28 RX ADMIN — CLOPIDOGREL BISULFATE 75 MG: 75 TABLET ORAL at 09:33

## 2017-05-28 RX ADMIN — AMLODIPINE BESYLATE 10 MG: 10 TABLET ORAL at 09:32

## 2017-05-29 ENCOUNTER — APPOINTMENT (OUTPATIENT)
Dept: GENERAL RADIOLOGY | Facility: HOSPITAL | Age: 62
End: 2017-05-29

## 2017-05-29 ENCOUNTER — HOSPITAL ENCOUNTER (OUTPATIENT)
Facility: HOSPITAL | Age: 62
Setting detail: OBSERVATION
Discharge: HOME | End: 2017-06-11
Attending: EMERGENCY MEDICINE | Admitting: FAMILY MEDICINE

## 2017-05-29 DIAGNOSIS — R41.0 CONFUSION: ICD-10-CM

## 2017-05-29 DIAGNOSIS — G93.41 METABOLIC ENCEPHALOPATHY: Primary | ICD-10-CM

## 2017-05-29 DIAGNOSIS — N17.9 ACUTE KIDNEY INJURY (HCC): ICD-10-CM

## 2017-05-29 DIAGNOSIS — I67.9 CEREBRAL HYPOPERFUSION: ICD-10-CM

## 2017-05-29 PROBLEM — E87.6 HYPOKALEMIA: Status: ACTIVE | Noted: 2017-05-29

## 2017-05-29 PROBLEM — R41.82 ALTERED MENTAL STATUS: Status: ACTIVE | Noted: 2017-05-29

## 2017-05-29 PROBLEM — R82.81 PYURIA: Status: ACTIVE | Noted: 2017-05-29

## 2017-05-29 PROBLEM — D72.829 LEUKOCYTOSIS: Status: ACTIVE | Noted: 2017-05-29

## 2017-05-29 LAB
ALBUMIN SERPL-MCNC: 4.7 G/DL (ref 3.2–4.8)
ALBUMIN/GLOB SERPL: 1.3 G/DL (ref 1.5–2.5)
ALP SERPL-CCNC: 85 U/L (ref 25–100)
ALT SERPL W P-5'-P-CCNC: 24 U/L (ref 7–40)
AMPHET+METHAMPHET UR QL: NEGATIVE
AMPHETAMINES UR QL: NEGATIVE
ANION GAP SERPL CALCULATED.3IONS-SCNC: 11 MMOL/L (ref 3–11)
AST SERPL-CCNC: 22 U/L (ref 0–33)
BACTERIA SPEC AEROBE CULT: NORMAL
BACTERIA SPEC AEROBE CULT: NORMAL
BACTERIA UR QL AUTO: ABNORMAL /HPF
BARBITURATES UR QL SCN: NEGATIVE
BASOPHILS # BLD AUTO: 0.02 10*3/MM3 (ref 0–0.2)
BASOPHILS NFR BLD AUTO: 0.2 % (ref 0–1)
BENZODIAZ UR QL SCN: NEGATIVE
BILIRUB SERPL-MCNC: 1.5 MG/DL (ref 0.3–1.2)
BILIRUB UR QL STRIP: ABNORMAL
BUN BLD-MCNC: 32 MG/DL (ref 9–23)
BUN/CREAT SERPL: 16.8 (ref 7–25)
BUPRENORPHINE SERPL-MCNC: NEGATIVE NG/ML
CALCIUM SPEC-SCNC: 10.1 MG/DL (ref 8.7–10.4)
CANNABINOIDS SERPL QL: NEGATIVE
CHLORIDE SERPL-SCNC: 103 MMOL/L (ref 99–109)
CLARITY UR: ABNORMAL
CO2 SERPL-SCNC: 26 MMOL/L (ref 20–31)
COCAINE UR QL: NEGATIVE
COLOR UR: ABNORMAL
CREAT BLD-MCNC: 1.9 MG/DL (ref 0.6–1.3)
DEPRECATED RDW RBC AUTO: 45.2 FL (ref 37–54)
EOSINOPHIL # BLD AUTO: 0.03 10*3/MM3 (ref 0.1–0.3)
EOSINOPHIL NFR BLD AUTO: 0.3 % (ref 0–3)
ERYTHROCYTE [DISTWIDTH] IN BLOOD BY AUTOMATED COUNT: 13.9 % (ref 11.3–14.5)
GFR SERPL CREATININE-BSD FRML MDRD: 36 ML/MIN/1.73
GLOBULIN UR ELPH-MCNC: 3.5 GM/DL
GLUCOSE BLD-MCNC: 167 MG/DL (ref 70–100)
GLUCOSE UR STRIP-MCNC: NEGATIVE MG/DL
HCT VFR BLD AUTO: 48.2 % (ref 38.9–50.9)
HGB BLD-MCNC: 16.5 G/DL (ref 13.1–17.5)
HGB UR QL STRIP.AUTO: NEGATIVE
HOLD SPECIMEN: NORMAL
HOLD SPECIMEN: NORMAL
HYALINE CASTS UR QL AUTO: ABNORMAL /LPF
IMM GRANULOCYTES # BLD: 0.04 10*3/MM3 (ref 0–0.03)
IMM GRANULOCYTES NFR BLD: 0.4 % (ref 0–0.6)
KETONES UR QL STRIP: ABNORMAL
LEUKOCYTE ESTERASE UR QL STRIP.AUTO: NEGATIVE
LYMPHOCYTES # BLD AUTO: 2.32 10*3/MM3 (ref 0.6–4.8)
LYMPHOCYTES NFR BLD AUTO: 21.4 % (ref 24–44)
MCH RBC QN AUTO: 30.6 PG (ref 27–31)
MCHC RBC AUTO-ENTMCNC: 34.2 G/DL (ref 32–36)
MCV RBC AUTO: 89.3 FL (ref 80–99)
METHADONE UR QL SCN: NEGATIVE
MONOCYTES # BLD AUTO: 1.14 10*3/MM3 (ref 0–1)
MONOCYTES NFR BLD AUTO: 10.5 % (ref 0–12)
NEUTROPHILS # BLD AUTO: 7.28 10*3/MM3 (ref 1.5–8.3)
NEUTROPHILS NFR BLD AUTO: 67.2 % (ref 41–71)
NITRITE UR QL STRIP: NEGATIVE
OPIATES UR QL: POSITIVE
OXYCODONE UR QL SCN: NEGATIVE
PCP UR QL SCN: NEGATIVE
PH UR STRIP.AUTO: <=5 [PH] (ref 5–8)
PLATELET # BLD AUTO: 229 10*3/MM3 (ref 150–450)
PMV BLD AUTO: 10.6 FL (ref 6–12)
POTASSIUM BLD-SCNC: 3.1 MMOL/L (ref 3.5–5.5)
PROPOXYPH UR QL: NEGATIVE
PROT SERPL-MCNC: 8.2 G/DL (ref 5.7–8.2)
PROT UR QL STRIP: ABNORMAL
RBC # BLD AUTO: 5.4 10*6/MM3 (ref 4.2–5.76)
RBC # UR: ABNORMAL /HPF
REAGIN AB CSF QL: NON REACTIVE
REF LAB TEST METHOD: ABNORMAL
SODIUM BLD-SCNC: 140 MMOL/L (ref 132–146)
SP GR UR STRIP: 1.02 (ref 1–1.03)
SQUAMOUS #/AREA URNS HPF: ABNORMAL /HPF
TRICYCLICS UR QL SCN: NEGATIVE
UROBILINOGEN UR QL STRIP: ABNORMAL
WBC NRBC COR # BLD: 10.83 10*3/MM3 (ref 3.5–10.8)
WBC UR QL AUTO: ABNORMAL /HPF
WHOLE BLOOD HOLD SPECIMEN: NORMAL
WHOLE BLOOD HOLD SPECIMEN: NORMAL

## 2017-05-29 PROCEDURE — G0378 HOSPITAL OBSERVATION PER HR: HCPCS

## 2017-05-29 PROCEDURE — 71010 HC CHEST PA OR AP: CPT

## 2017-05-29 PROCEDURE — 80053 COMPREHEN METABOLIC PANEL: CPT | Performed by: EMERGENCY MEDICINE

## 2017-05-29 PROCEDURE — 87086 URINE CULTURE/COLONY COUNT: CPT | Performed by: NURSE PRACTITIONER

## 2017-05-29 PROCEDURE — 81001 URINALYSIS AUTO W/SCOPE: CPT | Performed by: NURSE PRACTITIONER

## 2017-05-29 PROCEDURE — 80306 DRUG TEST PRSMV INSTRMNT: CPT | Performed by: EMERGENCY MEDICINE

## 2017-05-29 PROCEDURE — 96372 THER/PROPH/DIAG INJ SC/IM: CPT

## 2017-05-29 PROCEDURE — 85025 COMPLETE CBC W/AUTO DIFF WBC: CPT | Performed by: EMERGENCY MEDICINE

## 2017-05-29 PROCEDURE — 99285 EMERGENCY DEPT VISIT HI MDM: CPT

## 2017-05-29 PROCEDURE — 99220 PR INITIAL OBSERVATION CARE/DAY 70 MINUTES: CPT | Performed by: FAMILY MEDICINE

## 2017-05-29 PROCEDURE — 25010000003 CEFTRIAXONE PER 250 MG: Performed by: FAMILY MEDICINE

## 2017-05-29 PROCEDURE — 96365 THER/PROPH/DIAG IV INF INIT: CPT

## 2017-05-29 RX ORDER — FUROSEMIDE 20 MG/1
20 TABLET ORAL DAILY
Status: CANCELLED | OUTPATIENT
Start: 2017-05-29

## 2017-05-29 RX ORDER — AMLODIPINE BESYLATE 10 MG/1
10 TABLET ORAL DAILY
Status: DISCONTINUED | OUTPATIENT
Start: 2017-05-30 | End: 2017-06-11 | Stop reason: HOSPADM

## 2017-05-29 RX ORDER — CARVEDILOL 6.25 MG/1
6.25 TABLET ORAL 2 TIMES DAILY
Status: DISCONTINUED | OUTPATIENT
Start: 2017-05-30 | End: 2017-06-11 | Stop reason: HOSPADM

## 2017-05-29 RX ORDER — ONDANSETRON 2 MG/ML
4 INJECTION INTRAMUSCULAR; INTRAVENOUS EVERY 6 HOURS PRN
Status: DISCONTINUED | OUTPATIENT
Start: 2017-05-29 | End: 2017-06-11 | Stop reason: HOSPADM

## 2017-05-29 RX ORDER — POTASSIUM CHLORIDE 750 MG/1
20 CAPSULE, EXTENDED RELEASE ORAL ONCE
Status: COMPLETED | OUTPATIENT
Start: 2017-05-30 | End: 2017-05-29

## 2017-05-29 RX ORDER — HEPARIN SODIUM 5000 [USP'U]/ML
5000 INJECTION, SOLUTION INTRAVENOUS; SUBCUTANEOUS EVERY 8 HOURS SCHEDULED
Status: DISCONTINUED | OUTPATIENT
Start: 2017-05-30 | End: 2017-06-11 | Stop reason: HOSPADM

## 2017-05-29 RX ORDER — CLOPIDOGREL BISULFATE 75 MG/1
75 TABLET ORAL DAILY
Status: DISCONTINUED | OUTPATIENT
Start: 2017-05-30 | End: 2017-06-11 | Stop reason: HOSPADM

## 2017-05-29 RX ORDER — SODIUM CHLORIDE 9 MG/ML
100 INJECTION, SOLUTION INTRAVENOUS ONCE
Status: COMPLETED | OUTPATIENT
Start: 2017-05-30 | End: 2017-05-29

## 2017-05-29 RX ORDER — HYDROXYZINE HYDROCHLORIDE 25 MG/1
25 TABLET, FILM COATED ORAL EVERY 6 HOURS PRN
Status: DISCONTINUED | OUTPATIENT
Start: 2017-05-29 | End: 2017-06-11 | Stop reason: HOSPADM

## 2017-05-29 RX ORDER — ATORVASTATIN CALCIUM 40 MG/1
80 TABLET, FILM COATED ORAL NIGHTLY
Status: DISCONTINUED | OUTPATIENT
Start: 2017-05-30 | End: 2017-06-11 | Stop reason: HOSPADM

## 2017-05-29 RX ORDER — ACETAMINOPHEN 325 MG/1
650 TABLET ORAL EVERY 4 HOURS PRN
Status: DISCONTINUED | OUTPATIENT
Start: 2017-05-29 | End: 2017-05-30

## 2017-05-29 RX ORDER — ONDANSETRON 4 MG/1
4 TABLET, FILM COATED ORAL EVERY 6 HOURS PRN
Status: DISCONTINUED | OUTPATIENT
Start: 2017-05-29 | End: 2017-06-11 | Stop reason: HOSPADM

## 2017-05-29 RX ORDER — ASPIRIN 81 MG/1
81 TABLET ORAL DAILY
Status: DISCONTINUED | OUTPATIENT
Start: 2017-05-30 | End: 2017-06-11 | Stop reason: HOSPADM

## 2017-05-29 RX ORDER — CEFTRIAXONE SODIUM 1 G/50ML
1 INJECTION, SOLUTION INTRAVENOUS EVERY 24 HOURS
Status: DISCONTINUED | OUTPATIENT
Start: 2017-05-29 | End: 2017-06-02

## 2017-05-29 RX ADMIN — SODIUM CHLORIDE 100 ML/HR: 9 INJECTION, SOLUTION INTRAVENOUS at 23:55

## 2017-05-29 RX ADMIN — HEPARIN SODIUM 5000 UNITS: 5000 INJECTION, SOLUTION INTRAVENOUS; SUBCUTANEOUS at 23:55

## 2017-05-29 RX ADMIN — CEFTRIAXONE SODIUM 1 G: 1 INJECTION, SOLUTION INTRAVENOUS at 22:28

## 2017-05-29 RX ADMIN — ACETAMINOPHEN 650 MG: 325 TABLET, FILM COATED ORAL at 23:54

## 2017-05-29 RX ADMIN — ATORVASTATIN CALCIUM 80 MG: 40 TABLET, FILM COATED ORAL at 23:54

## 2017-05-29 RX ADMIN — POTASSIUM CHLORIDE 20 MEQ: 750 CAPSULE, EXTENDED RELEASE ORAL at 23:54

## 2017-05-30 LAB
ALBUMIN SERPL-MCNC: 4.2 G/DL (ref 3.2–4.8)
ALBUMIN/GLOB SERPL: 1.3 G/DL (ref 1.5–2.5)
ALP SERPL-CCNC: 78 U/L (ref 25–100)
ALT SERPL W P-5'-P-CCNC: 21 U/L (ref 7–40)
ANION GAP SERPL CALCULATED.3IONS-SCNC: 8 MMOL/L (ref 3–11)
AST SERPL-CCNC: 23 U/L (ref 0–33)
BASOPHILS # BLD AUTO: 0.02 10*3/MM3 (ref 0–0.2)
BASOPHILS NFR BLD AUTO: 0.2 % (ref 0–1)
BILIRUB SERPL-MCNC: 1.8 MG/DL (ref 0.3–1.2)
BUN BLD-MCNC: 29 MG/DL (ref 9–23)
BUN/CREAT SERPL: 29 (ref 7–25)
CALCIUM SPEC-SCNC: 9.5 MG/DL (ref 8.7–10.4)
CHLORIDE SERPL-SCNC: 105 MMOL/L (ref 99–109)
CO2 SERPL-SCNC: 26 MMOL/L (ref 20–31)
CREAT BLD-MCNC: 1 MG/DL (ref 0.6–1.3)
DEPRECATED RDW RBC AUTO: 44.2 FL (ref 37–54)
EOSINOPHIL # BLD AUTO: 0.09 10*3/MM3 (ref 0.1–0.3)
EOSINOPHIL NFR BLD AUTO: 0.8 % (ref 0–3)
ERYTHROCYTE [DISTWIDTH] IN BLOOD BY AUTOMATED COUNT: 13.7 % (ref 11.3–14.5)
GFR SERPL CREATININE-BSD FRML MDRD: 76 ML/MIN/1.73
GLOBULIN UR ELPH-MCNC: 3.2 GM/DL
GLUCOSE BLD-MCNC: 126 MG/DL (ref 70–100)
HCT VFR BLD AUTO: 45.2 % (ref 38.9–50.9)
HGB BLD-MCNC: 15.4 G/DL (ref 13.1–17.5)
IMM GRANULOCYTES # BLD: 0.03 10*3/MM3 (ref 0–0.03)
IMM GRANULOCYTES NFR BLD: 0.3 % (ref 0–0.6)
LYMPHOCYTES # BLD AUTO: 2.89 10*3/MM3 (ref 0.6–4.8)
LYMPHOCYTES NFR BLD AUTO: 26.1 % (ref 24–44)
MCH RBC QN AUTO: 30 PG (ref 27–31)
MCHC RBC AUTO-ENTMCNC: 34.1 G/DL (ref 32–36)
MCV RBC AUTO: 87.9 FL (ref 80–99)
MONOCYTES # BLD AUTO: 1.24 10*3/MM3 (ref 0–1)
MONOCYTES NFR BLD AUTO: 11.2 % (ref 0–12)
NEUTROPHILS # BLD AUTO: 6.8 10*3/MM3 (ref 1.5–8.3)
NEUTROPHILS NFR BLD AUTO: 61.4 % (ref 41–71)
PLATELET # BLD AUTO: 217 10*3/MM3 (ref 150–450)
PMV BLD AUTO: 10.5 FL (ref 6–12)
POTASSIUM BLD-SCNC: 3.1 MMOL/L (ref 3.5–5.5)
POTASSIUM BLD-SCNC: 3.3 MMOL/L (ref 3.5–5.5)
PROT SERPL-MCNC: 7.4 G/DL (ref 5.7–8.2)
RBC # BLD AUTO: 5.14 10*6/MM3 (ref 4.2–5.76)
SODIUM BLD-SCNC: 139 MMOL/L (ref 132–146)
WBC NRBC COR # BLD: 11.07 10*3/MM3 (ref 3.5–10.8)

## 2017-05-30 PROCEDURE — 99244 OFF/OP CNSLTJ NEW/EST MOD 40: CPT | Performed by: PSYCHIATRY & NEUROLOGY

## 2017-05-30 PROCEDURE — 96372 THER/PROPH/DIAG INJ SC/IM: CPT

## 2017-05-30 PROCEDURE — 80053 COMPREHEN METABOLIC PANEL: CPT | Performed by: NURSE PRACTITIONER

## 2017-05-30 PROCEDURE — G0378 HOSPITAL OBSERVATION PER HR: HCPCS

## 2017-05-30 PROCEDURE — 25010000003 CEFTRIAXONE PER 250 MG: Performed by: FAMILY MEDICINE

## 2017-05-30 PROCEDURE — 84132 ASSAY OF SERUM POTASSIUM: CPT | Performed by: NURSE PRACTITIONER

## 2017-05-30 PROCEDURE — 25010000002 HEPARIN (PORCINE) PER 1000 UNITS: Performed by: NURSE PRACTITIONER

## 2017-05-30 PROCEDURE — 85025 COMPLETE CBC W/AUTO DIFF WBC: CPT | Performed by: NURSE PRACTITIONER

## 2017-05-30 PROCEDURE — 99226 PR SBSQ OBSERVATION CARE/DAY 35 MINUTES: CPT | Performed by: INTERNAL MEDICINE

## 2017-05-30 RX ORDER — SODIUM CHLORIDE 9 MG/ML
100 INJECTION, SOLUTION INTRAVENOUS CONTINUOUS
Status: DISCONTINUED | OUTPATIENT
Start: 2017-05-30 | End: 2017-06-10

## 2017-05-30 RX ORDER — POTASSIUM CHLORIDE 7.45 MG/ML
10 INJECTION INTRAVENOUS
Status: DISCONTINUED | OUTPATIENT
Start: 2017-05-30 | End: 2017-06-11 | Stop reason: HOSPADM

## 2017-05-30 RX ORDER — HYDROCODONE BITARTRATE AND ACETAMINOPHEN 5; 325 MG/1; MG/1
1 TABLET ORAL EVERY 6 HOURS PRN
Status: DISPENSED | OUTPATIENT
Start: 2017-05-30 | End: 2017-06-09

## 2017-05-30 RX ORDER — POTASSIUM CHLORIDE 750 MG/1
40 CAPSULE, EXTENDED RELEASE ORAL AS NEEDED
Status: DISCONTINUED | OUTPATIENT
Start: 2017-05-30 | End: 2017-06-11 | Stop reason: HOSPADM

## 2017-05-30 RX ORDER — POTASSIUM CHLORIDE 1.5 G/1.77G
40 POWDER, FOR SOLUTION ORAL AS NEEDED
Status: DISCONTINUED | OUTPATIENT
Start: 2017-05-30 | End: 2017-06-11 | Stop reason: HOSPADM

## 2017-05-30 RX ORDER — ACETAMINOPHEN 325 MG/1
650 TABLET ORAL EVERY 6 HOURS PRN
Status: DISCONTINUED | OUTPATIENT
Start: 2017-05-30 | End: 2017-06-11 | Stop reason: HOSPADM

## 2017-05-30 RX ADMIN — SODIUM CHLORIDE 100 ML/HR: 9 INJECTION, SOLUTION INTRAVENOUS at 23:52

## 2017-05-30 RX ADMIN — HYDROCODONE BITARTATE AND ACETAMINOPHEN 1 TABLET: 5; 325 TABLET ORAL at 02:07

## 2017-05-30 RX ADMIN — ASPIRIN 81 MG: 81 TABLET, COATED ORAL at 08:42

## 2017-05-30 RX ADMIN — POTASSIUM CHLORIDE 40 MEQ: 750 CAPSULE, EXTENDED RELEASE ORAL at 08:42

## 2017-05-30 RX ADMIN — HEPARIN SODIUM 5000 UNITS: 5000 INJECTION, SOLUTION INTRAVENOUS; SUBCUTANEOUS at 14:56

## 2017-05-30 RX ADMIN — SODIUM CHLORIDE 100 ML/HR: 9 INJECTION, SOLUTION INTRAVENOUS at 08:43

## 2017-05-30 RX ADMIN — CARVEDILOL 6.25 MG: 6.25 TABLET, FILM COATED ORAL at 17:52

## 2017-05-30 RX ADMIN — CLOPIDOGREL BISULFATE 75 MG: 75 TABLET ORAL at 08:42

## 2017-05-30 RX ADMIN — HEPARIN SODIUM 5000 UNITS: 5000 INJECTION, SOLUTION INTRAVENOUS; SUBCUTANEOUS at 20:53

## 2017-05-30 RX ADMIN — ATORVASTATIN CALCIUM 80 MG: 40 TABLET, FILM COATED ORAL at 20:53

## 2017-05-30 RX ADMIN — HEPARIN SODIUM 5000 UNITS: 5000 INJECTION, SOLUTION INTRAVENOUS; SUBCUTANEOUS at 05:15

## 2017-05-30 RX ADMIN — CARVEDILOL 6.25 MG: 6.25 TABLET, FILM COATED ORAL at 08:42

## 2017-05-30 RX ADMIN — CEFTRIAXONE SODIUM 1 G: 1 INJECTION, SOLUTION INTRAVENOUS at 23:30

## 2017-05-30 RX ADMIN — POTASSIUM CHLORIDE 40 MEQ: 750 CAPSULE, EXTENDED RELEASE ORAL at 19:27

## 2017-05-30 RX ADMIN — HYDROXYZINE HYDROCHLORIDE 25 MG: 25 TABLET, FILM COATED ORAL at 23:52

## 2017-05-30 RX ADMIN — HYDROCODONE BITARTATE AND ACETAMINOPHEN 1 TABLET: 5; 325 TABLET ORAL at 17:55

## 2017-05-30 RX ADMIN — AMLODIPINE BESYLATE 10 MG: 10 TABLET ORAL at 08:42

## 2017-05-30 RX ADMIN — POTASSIUM CHLORIDE 40 MEQ: 750 CAPSULE, EXTENDED RELEASE ORAL at 23:30

## 2017-05-30 RX ADMIN — HYDROCODONE BITARTATE AND ACETAMINOPHEN 1 TABLET: 5; 325 TABLET ORAL at 23:33

## 2017-05-31 LAB
ACE CSF-CCNC: 0.8 U/L (ref 0–2.5)
BACTERIA SPEC AEROBE CULT: NORMAL
POTASSIUM BLD-SCNC: 3.9 MMOL/L (ref 3.5–5.5)

## 2017-05-31 PROCEDURE — 99225 PR SBSQ OBSERVATION CARE/DAY 25 MINUTES: CPT | Performed by: INTERNAL MEDICINE

## 2017-05-31 PROCEDURE — 25010000003 CEFTRIAXONE PER 250 MG: Performed by: FAMILY MEDICINE

## 2017-05-31 PROCEDURE — 25010000002 HEPARIN (PORCINE) PER 1000 UNITS: Performed by: NURSE PRACTITIONER

## 2017-05-31 PROCEDURE — G0378 HOSPITAL OBSERVATION PER HR: HCPCS

## 2017-05-31 PROCEDURE — 96372 THER/PROPH/DIAG INJ SC/IM: CPT

## 2017-05-31 PROCEDURE — 84132 ASSAY OF SERUM POTASSIUM: CPT | Performed by: INTERNAL MEDICINE

## 2017-05-31 RX ADMIN — ASPIRIN 81 MG: 81 TABLET, COATED ORAL at 08:23

## 2017-05-31 RX ADMIN — CARVEDILOL 6.25 MG: 6.25 TABLET, FILM COATED ORAL at 17:52

## 2017-05-31 RX ADMIN — HYDROCODONE BITARTATE AND ACETAMINOPHEN 1 TABLET: 5; 325 TABLET ORAL at 20:22

## 2017-05-31 RX ADMIN — ATORVASTATIN CALCIUM 80 MG: 40 TABLET, FILM COATED ORAL at 20:22

## 2017-05-31 RX ADMIN — HYDROCODONE BITARTATE AND ACETAMINOPHEN 1 TABLET: 5; 325 TABLET ORAL at 05:27

## 2017-05-31 RX ADMIN — HEPARIN SODIUM 5000 UNITS: 5000 INJECTION, SOLUTION INTRAVENOUS; SUBCUTANEOUS at 05:17

## 2017-05-31 RX ADMIN — CLOPIDOGREL BISULFATE 75 MG: 75 TABLET ORAL at 08:23

## 2017-05-31 RX ADMIN — HEPARIN SODIUM 5000 UNITS: 5000 INJECTION, SOLUTION INTRAVENOUS; SUBCUTANEOUS at 20:22

## 2017-05-31 RX ADMIN — HEPARIN SODIUM 5000 UNITS: 5000 INJECTION, SOLUTION INTRAVENOUS; SUBCUTANEOUS at 16:03

## 2017-05-31 RX ADMIN — CARVEDILOL 6.25 MG: 6.25 TABLET, FILM COATED ORAL at 08:23

## 2017-05-31 RX ADMIN — CEFTRIAXONE SODIUM 1 G: 1 INJECTION, SOLUTION INTRAVENOUS at 23:38

## 2017-05-31 RX ADMIN — AMLODIPINE BESYLATE 10 MG: 10 TABLET ORAL at 08:23

## 2017-06-01 LAB
ALBUMIN SERPL-MCNC: 4.4 G/DL (ref 3.2–4.8)
ALBUMIN/GLOB SERPL: 1.3 G/DL (ref 1.5–2.5)
ALP SERPL-CCNC: 83 U/L (ref 25–100)
ALT SERPL W P-5'-P-CCNC: 30 U/L (ref 7–40)
ANION GAP SERPL CALCULATED.3IONS-SCNC: 5 MMOL/L (ref 3–11)
AST SERPL-CCNC: 32 U/L (ref 0–33)
BASOPHILS # BLD AUTO: 0.02 10*3/MM3 (ref 0–0.2)
BASOPHILS NFR BLD AUTO: 0.2 % (ref 0–1)
BILIRUB SERPL-MCNC: 1.2 MG/DL (ref 0.3–1.2)
BUN BLD-MCNC: 13 MG/DL (ref 9–23)
BUN/CREAT SERPL: 16.3 (ref 7–25)
CALCIUM SPEC-SCNC: 9.6 MG/DL (ref 8.7–10.4)
CHLORIDE SERPL-SCNC: 98 MMOL/L (ref 99–109)
CO2 SERPL-SCNC: 28 MMOL/L (ref 20–31)
CREAT BLD-MCNC: 0.8 MG/DL (ref 0.6–1.3)
CRP SERPL-MCNC: <0.01 MG/DL (ref 0–1)
DEPRECATED RDW RBC AUTO: 45.1 FL (ref 37–54)
EOSINOPHIL # BLD AUTO: 0.09 10*3/MM3 (ref 0.1–0.3)
EOSINOPHIL NFR BLD AUTO: 1.1 % (ref 0–3)
ERYTHROCYTE [DISTWIDTH] IN BLOOD BY AUTOMATED COUNT: 13.6 % (ref 11.3–14.5)
ERYTHROCYTE [SEDIMENTATION RATE] IN BLOOD: 10 MM/HR (ref 0–20)
GFR SERPL CREATININE-BSD FRML MDRD: 99 ML/MIN/1.73
GLOBULIN UR ELPH-MCNC: 3.3 GM/DL
GLUCOSE BLD-MCNC: 125 MG/DL (ref 70–100)
HCT VFR BLD AUTO: 47.9 % (ref 38.9–50.9)
HGB BLD-MCNC: 15.8 G/DL (ref 13.1–17.5)
HIV1+2 AB SER QL: NORMAL
IMM GRANULOCYTES # BLD: 0.02 10*3/MM3 (ref 0–0.03)
IMM GRANULOCYTES NFR BLD: 0.2 % (ref 0–0.6)
LYMPHOCYTES # BLD AUTO: 2.48 10*3/MM3 (ref 0.6–4.8)
LYMPHOCYTES NFR BLD AUTO: 29.8 % (ref 24–44)
MCH RBC QN AUTO: 29.9 PG (ref 27–31)
MCHC RBC AUTO-ENTMCNC: 33 G/DL (ref 32–36)
MCV RBC AUTO: 90.7 FL (ref 80–99)
MONOCYTES # BLD AUTO: 0.99 10*3/MM3 (ref 0–1)
MONOCYTES NFR BLD AUTO: 11.9 % (ref 0–12)
NEUTROPHILS # BLD AUTO: 4.73 10*3/MM3 (ref 1.5–8.3)
NEUTROPHILS NFR BLD AUTO: 56.8 % (ref 41–71)
PLATELET # BLD AUTO: 196 10*3/MM3 (ref 150–450)
PMV BLD AUTO: 10 FL (ref 6–12)
POTASSIUM BLD-SCNC: 3.9 MMOL/L (ref 3.5–5.5)
PROT SERPL-MCNC: 7.7 G/DL (ref 5.7–8.2)
RBC # BLD AUTO: 5.28 10*6/MM3 (ref 4.2–5.76)
SODIUM BLD-SCNC: 131 MMOL/L (ref 132–146)
WBC NRBC COR # BLD: 8.33 10*3/MM3 (ref 3.5–10.8)

## 2017-06-01 PROCEDURE — 86162 COMPLEMENT TOTAL (CH50): CPT | Performed by: INTERNAL MEDICINE

## 2017-06-01 PROCEDURE — 85025 COMPLETE CBC W/AUTO DIFF WBC: CPT | Performed by: INTERNAL MEDICINE

## 2017-06-01 PROCEDURE — G0432 EIA HIV-1/HIV-2 SCREEN: HCPCS | Performed by: INTERNAL MEDICINE

## 2017-06-01 PROCEDURE — 86256 FLUORESCENT ANTIBODY TITER: CPT | Performed by: INTERNAL MEDICINE

## 2017-06-01 PROCEDURE — 83520 IMMUNOASSAY QUANT NOS NONAB: CPT | Performed by: INTERNAL MEDICINE

## 2017-06-01 PROCEDURE — 85652 RBC SED RATE AUTOMATED: CPT | Performed by: INTERNAL MEDICINE

## 2017-06-01 PROCEDURE — 96372 THER/PROPH/DIAG INJ SC/IM: CPT

## 2017-06-01 PROCEDURE — 25010000002 HEPARIN (PORCINE) PER 1000 UNITS: Performed by: NURSE PRACTITIONER

## 2017-06-01 PROCEDURE — 86160 COMPLEMENT ANTIGEN: CPT | Performed by: INTERNAL MEDICINE

## 2017-06-01 PROCEDURE — 86140 C-REACTIVE PROTEIN: CPT | Performed by: INTERNAL MEDICINE

## 2017-06-01 PROCEDURE — 99226 PR SBSQ OBSERVATION CARE/DAY 35 MINUTES: CPT | Performed by: INTERNAL MEDICINE

## 2017-06-01 PROCEDURE — 80053 COMPREHEN METABOLIC PANEL: CPT | Performed by: INTERNAL MEDICINE

## 2017-06-01 PROCEDURE — G0378 HOSPITAL OBSERVATION PER HR: HCPCS

## 2017-06-01 RX ADMIN — ATORVASTATIN CALCIUM 80 MG: 40 TABLET, FILM COATED ORAL at 20:52

## 2017-06-01 RX ADMIN — HEPARIN SODIUM 5000 UNITS: 5000 INJECTION, SOLUTION INTRAVENOUS; SUBCUTANEOUS at 05:34

## 2017-06-01 RX ADMIN — AMLODIPINE BESYLATE 10 MG: 10 TABLET ORAL at 08:41

## 2017-06-01 RX ADMIN — CLOPIDOGREL BISULFATE 75 MG: 75 TABLET ORAL at 08:41

## 2017-06-01 RX ADMIN — HEPARIN SODIUM 5000 UNITS: 5000 INJECTION, SOLUTION INTRAVENOUS; SUBCUTANEOUS at 14:44

## 2017-06-01 RX ADMIN — CARVEDILOL 6.25 MG: 6.25 TABLET, FILM COATED ORAL at 08:42

## 2017-06-01 RX ADMIN — HYDROCODONE BITARTATE AND ACETAMINOPHEN 1 TABLET: 5; 325 TABLET ORAL at 22:09

## 2017-06-01 RX ADMIN — HYDROCODONE BITARTATE AND ACETAMINOPHEN 1 TABLET: 5; 325 TABLET ORAL at 05:34

## 2017-06-01 RX ADMIN — CARVEDILOL 6.25 MG: 6.25 TABLET, FILM COATED ORAL at 17:46

## 2017-06-01 RX ADMIN — ASPIRIN 81 MG: 81 TABLET, COATED ORAL at 08:42

## 2017-06-01 RX ADMIN — HEPARIN SODIUM 5000 UNITS: 5000 INJECTION, SOLUTION INTRAVENOUS; SUBCUTANEOUS at 20:52

## 2017-06-02 ENCOUNTER — APPOINTMENT (OUTPATIENT)
Dept: MRI IMAGING | Facility: HOSPITAL | Age: 62
End: 2017-06-02

## 2017-06-02 LAB
BACTERIA SPEC AEROBE CULT: NORMAL
C4 SERPL-MCNC: 35 MG/DL (ref 14–44)
CH50 SERPL-ACNC: 40 U/ML (ref 42–60)
GRAM STN SPEC: NORMAL

## 2017-06-02 PROCEDURE — 99226 PR SBSQ OBSERVATION CARE/DAY 35 MINUTES: CPT | Performed by: INTERNAL MEDICINE

## 2017-06-02 PROCEDURE — 70551 MRI BRAIN STEM W/O DYE: CPT

## 2017-06-02 PROCEDURE — G0378 HOSPITAL OBSERVATION PER HR: HCPCS

## 2017-06-02 PROCEDURE — 96372 THER/PROPH/DIAG INJ SC/IM: CPT

## 2017-06-02 PROCEDURE — 25010000002 HEPARIN (PORCINE) PER 1000 UNITS: Performed by: NURSE PRACTITIONER

## 2017-06-02 PROCEDURE — 25010000003 CEFTRIAXONE PER 250 MG: Performed by: FAMILY MEDICINE

## 2017-06-02 RX ORDER — FOLIC ACID/VIT B COMPLEX AND C 0.8 MG
1 TABLET ORAL DAILY
Status: DISCONTINUED | OUTPATIENT
Start: 2017-06-02 | End: 2017-06-11 | Stop reason: HOSPADM

## 2017-06-02 RX ORDER — THIAMINE MONONITRATE (VIT B1) 100 MG
100 TABLET ORAL DAILY
Status: DISCONTINUED | OUTPATIENT
Start: 2017-06-02 | End: 2017-06-11 | Stop reason: HOSPADM

## 2017-06-02 RX ADMIN — HEPARIN SODIUM 5000 UNITS: 5000 INJECTION, SOLUTION INTRAVENOUS; SUBCUTANEOUS at 20:09

## 2017-06-02 RX ADMIN — AMLODIPINE BESYLATE 10 MG: 10 TABLET ORAL at 09:07

## 2017-06-02 RX ADMIN — CLOPIDOGREL BISULFATE 75 MG: 75 TABLET ORAL at 09:07

## 2017-06-02 RX ADMIN — CEFTRIAXONE SODIUM 1 G: 1 INJECTION, SOLUTION INTRAVENOUS at 00:04

## 2017-06-02 RX ADMIN — HYDROCODONE BITARTATE AND ACETAMINOPHEN 1 TABLET: 5; 325 TABLET ORAL at 04:13

## 2017-06-02 RX ADMIN — HYDROCODONE BITARTATE AND ACETAMINOPHEN 1 TABLET: 5; 325 TABLET ORAL at 22:08

## 2017-06-02 RX ADMIN — CARVEDILOL 6.25 MG: 6.25 TABLET, FILM COATED ORAL at 09:07

## 2017-06-02 RX ADMIN — Medication 1 TABLET: at 14:54

## 2017-06-02 RX ADMIN — ATORVASTATIN CALCIUM 80 MG: 40 TABLET, FILM COATED ORAL at 20:09

## 2017-06-02 RX ADMIN — HEPARIN SODIUM 5000 UNITS: 5000 INJECTION, SOLUTION INTRAVENOUS; SUBCUTANEOUS at 05:51

## 2017-06-02 RX ADMIN — HYDROCODONE BITARTATE AND ACETAMINOPHEN 1 TABLET: 5; 325 TABLET ORAL at 15:37

## 2017-06-02 RX ADMIN — HEPARIN SODIUM 5000 UNITS: 5000 INJECTION, SOLUTION INTRAVENOUS; SUBCUTANEOUS at 14:54

## 2017-06-02 RX ADMIN — CARVEDILOL 6.25 MG: 6.25 TABLET, FILM COATED ORAL at 17:24

## 2017-06-02 RX ADMIN — Medication 100 MG: at 14:54

## 2017-06-02 RX ADMIN — ASPIRIN 81 MG: 81 TABLET, COATED ORAL at 09:07

## 2017-06-02 NOTE — PROGRESS NOTES
Continued Stay Note  [facility]     Patient Name: Mack Gamez  MRN: [id number]  Today's Date: 6/2/2017    Admit Date: 5/29/2017          Discharge Plan     Consent obtained for the participation in the [facility] Transitions Program.  Cathleen Cole RN                Discharge Codes     None            Cathleen Cole RN

## 2017-06-02 NOTE — PROGRESS NOTES
Continued Stay Note  [facility]     Patient Name: Mack Gamez  MRN: [id number]  Today's Date: 6/2/2017    Admit Date: 5/29/2017          Discharge Plan       06/02/17 1511    Case Management/Social Work Plan    Plan TBD    Patient/Family In Agreement With Plan yes    Additional Comments Patient is currently homeless, he has a SO in [place], and two sons who live in [place]. Both social work and MD have been in contact with the SO and son. Plan at [place] is evolving. CM will follow.               Discharge Codes     None            Cha King RN

## 2017-06-02 NOTE — PLAN OF CARE
Problem: Confusion, Acute (Adult)  Goal: Identify Related Risk Factors and Signs and Symptoms  Outcome: Ongoing (interventions implemented as appropriate)

## 2017-06-02 NOTE — PROGRESS NOTES
[facility] Medicine Services  INPATIENT PROGRESS NOTE    Date of Admission: 5/29/2017  Length of Stay: 0  Primary Care Physician: No Known Provider    Subjective   CC: Encephalopathy  HPI:    Denies pain or weakness, looking forward to hospital discharge      Review Of Systems:   Review of Systems   Constitutional: Negative.    HENT: Negative.    Eyes: Negative.    Respiratory: Negative.    Cardiovascular: Negative.    Gastrointestinal: Negative.    Endocrine: Negative.    Genitourinary: Negative.    Musculoskeletal: Negative.    Allergic/Immunologic: Negative.    Neurological: Positive for confusion.   Hematological: Negative.    Psychiatric/Behavioral: Negative for agitation.         Objective      Temp:  [97.8 °F (36.6 °C)-98.9 °F (37.2 °C)] 97.8 °F (36.6 °C)  Heart Rate:  [76-88] 85  Resp:  [16-17] 17  BP: (131-151)/() 140/105  [unfilled]  No acute distress, sitting up on edge of bed  Speech clear, but mild confusion continues   strong  RRR, no murmur rub or gallop  CTAB  Abdomen soft, non tender, non distended, normal bowel sounds  No lower extremity cyanosis, clubbing or edema  Normal affect  Moves all extremities with ease  Calm demeanor      Results Review:    I have reviewed the labs, radiology results and diagnostic studies.      Results from last 7 days  Lab Units 06/01/17  0832   WBC 10*3/mm3 8.33   HEMOGLOBIN g/dL 15.8   PLATELETS 10*3/mm3 196       Results from last 7 days  Lab Units 06/01/17  0832   SODIUM mmol/L 131*   POTASSIUM mmol/L 3.9   CHLORIDE mmol/L 98*   TOTAL CO2 mmol/L 28.0   BUN mg/dL 13   CREATININE mg/dL 0.80   GLUCOSE mg/dL 125*   CALCIUM mg/dL 9.6       Culture Data: Cultures:    Blood Culture   Date Value Ref Range Status   05/24/2017 No growth at 5 days  Final   05/24/2017 No growth at 5 days  Final     Urine Culture   Date Value Ref Range Status   05/29/2017 No growth  Preliminary       Radiology Data:     I have reviewed the  medications.    amLODIPine 10 mg Oral Daily   aspirin 81 mg Oral Daily   atorvastatin 80 mg Oral Nightly   b complex-vitamin c-folic acid 1 tablet Oral Daily   carvedilol 6.25 mg Oral BID   ceftriaxone 1 g Intravenous Q24H   clopidogrel 75 mg Oral Daily   heparin (porcine) 5,000 Units Subcutaneous Q8H   thiamine 100 mg Oral Daily       Assessment/Plan     Problem List  Hospital Problem List     * (Principal)Metabolic encephalopathy    Hyperlipidemia    HTN (hypertension)    CAD (coronary artery disease)    Brain aneurysm    TY (acute kidney injury)    Leukocytosis    Hypokalemia    Pyuria               Assessment/Plan:    Encephalopathy  - possibly secondary to 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  - check markers of inflammation and vasculitis as patient has both dilation of coronary and vertebrobasilar arteries; however, discussed with Neurointervention Dr Henriquez, and after conferring with Cardiology this was felt to be an incidental finding. See Dr Henriquez's last note from prior hospitalization for further details.   - will need robust follow up plan with outpatient Neurology  - Repeat MRI Brain today    UTI  -s/p 5 days abx - Urine culture no growth, will dc further abx    HTN  - allow some permissive HTN    Dyslipidemia    CAD  - on asa and plavix    DVT prophylaxis: heparin    Discharge Planning: I expect patient to be discharged with family in ? Days.     Live Frazier MD   06/02/17   6:09 PM

## 2017-06-02 NOTE — PROGRESS NOTES
[facility] Medicine Services  INPATIENT PROGRESS NOTE    Date of Admission: 5/29/2017  Length of Stay: 0  Primary Care Physician: No Known Provider    Subjective   CC: Encephalopathy  HPI:    Denies pain or weakness.      Review Of Systems:   Review of Systems   Constitutional: Negative.    HENT: Negative.    Eyes: Negative.    Respiratory: Negative.    Cardiovascular: Negative.    Gastrointestinal: Negative.    Endocrine: Negative.    Genitourinary: Negative.    Musculoskeletal: Negative.    Allergic/Immunologic: Negative.    Neurological: Positive for confusion.   Hematological: Negative.    Psychiatric/Behavioral: Negative for agitation.         Objective      Temp:  [97.1 °F (36.2 °C)-98 °F (36.7 °C)] 97.9 °F (36.6 °C)  Heart Rate:  [84-87] 86  Resp:  [16-18] 16  BP: (115-151)/() 151/106  [unfilled]  No acute distress, sitting up on edge of bed  Speech clear, but mild confusion   RRR, no murmur rub or gallop  CTAB  Abdomen soft, non tender, non distended, normal bowel sounds  No lower extremity cyanosis, clubbing or edema  Normal affect  Moves all extremities with ease  Calm demeanor      Results Review:    I have reviewed the labs, radiology results and diagnostic studies.      Results from last 7 days  Lab Units 06/01/17  0832   WBC 10*3/mm3 8.33   HEMOGLOBIN g/dL 15.8   PLATELETS 10*3/mm3 196       Results from last 7 days  Lab Units 06/01/17  0832   SODIUM mmol/L 131*   POTASSIUM mmol/L 3.9   CHLORIDE mmol/L 98*   TOTAL CO2 mmol/L 28.0   BUN mg/dL 13   CREATININE mg/dL 0.80   GLUCOSE mg/dL 125*   CALCIUM mg/dL 9.6       Culture Data: Cultures:    Blood Culture   Date Value Ref Range Status   05/24/2017 No growth at 5 days  Final   05/24/2017 No growth at 5 days  Final     Urine Culture   Date Value Ref Range Status   05/29/2017 No growth  Preliminary       Radiology Data:     I have reviewed the medications.    amLODIPine 10 mg Oral Daily   aspirin 81 mg Oral Daily    atorvastatin 80 mg Oral Nightly   carvedilol 6.25 mg Oral BID   ceftriaxone 1 g Intravenous Q24H   clopidogrel 75 mg Oral Daily   heparin (porcine) 5,000 Units Subcutaneous Q8H       Assessment/Plan     Problem List  Hospital Problem List     * (Principal)Metabolic encephalopathy    Hyperlipidemia    HTN (hypertension)    CAD (coronary artery disease)    Brain aneurysm    TY (acute kidney injury)    Leukocytosis    Hypokalemia    Pyuria               Assessment/Plan:    Encephalopathy  - discussed with Neurology Dr Coon - Consider repeat MRI while hospitalized  - concern for CJD -- CSF studies still pending  - check markers of inflammation and vasculitis as patient has both dilation of coronary and vertebrobasilar arteries   - will need robust follow up plan with outpatient Neurology    UTI  - continue rocephin, follow cultures - if remain no growth will dc abx soon. Doubt infection playing    Dolichoetasia of vertebrobasilar system  - no stenoses or flow limiting lesions on cerebral angiogram    HTN    Dyslipidemia    CAD  - on asa and plavix      DVT prophylaxis: heparin    With patient permission, discussed case with patient's son Isra by phone today.      Discharge Planning: I expect patient to be discharged to ? in ? Days.     Live Frazier MD   06/01/17   10:06 PM

## 2017-06-03 PROCEDURE — 99226 PR SBSQ OBSERVATION CARE/DAY 35 MINUTES: CPT | Performed by: INTERNAL MEDICINE

## 2017-06-03 PROCEDURE — G0378 HOSPITAL OBSERVATION PER HR: HCPCS

## 2017-06-03 PROCEDURE — 25010000002 HEPARIN (PORCINE) PER 1000 UNITS: Performed by: NURSE PRACTITIONER

## 2017-06-03 PROCEDURE — 96372 THER/PROPH/DIAG INJ SC/IM: CPT

## 2017-06-03 RX ADMIN — Medication 1 TABLET: at 08:58

## 2017-06-03 RX ADMIN — ACETAMINOPHEN 650 MG: 325 TABLET, FILM COATED ORAL at 23:25

## 2017-06-03 RX ADMIN — ATORVASTATIN CALCIUM 80 MG: 40 TABLET, FILM COATED ORAL at 20:10

## 2017-06-03 RX ADMIN — HEPARIN SODIUM 5000 UNITS: 5000 INJECTION, SOLUTION INTRAVENOUS; SUBCUTANEOUS at 20:11

## 2017-06-03 RX ADMIN — CARVEDILOL 6.25 MG: 6.25 TABLET, FILM COATED ORAL at 17:20

## 2017-06-03 RX ADMIN — CLOPIDOGREL BISULFATE 75 MG: 75 TABLET ORAL at 08:58

## 2017-06-03 RX ADMIN — HEPARIN SODIUM 5000 UNITS: 5000 INJECTION, SOLUTION INTRAVENOUS; SUBCUTANEOUS at 15:49

## 2017-06-03 RX ADMIN — HEPARIN SODIUM 5000 UNITS: 5000 INJECTION, SOLUTION INTRAVENOUS; SUBCUTANEOUS at 05:05

## 2017-06-03 RX ADMIN — CARVEDILOL 6.25 MG: 6.25 TABLET, FILM COATED ORAL at 08:58

## 2017-06-03 RX ADMIN — ASPIRIN 81 MG: 81 TABLET, COATED ORAL at 08:58

## 2017-06-03 RX ADMIN — Medication 100 MG: at 08:58

## 2017-06-03 RX ADMIN — AMLODIPINE BESYLATE 10 MG: 10 TABLET ORAL at 08:58

## 2017-06-03 RX ADMIN — HYDROCODONE BITARTATE AND ACETAMINOPHEN 1 TABLET: 5; 325 TABLET ORAL at 20:10

## 2017-06-03 NOTE — PROGRESS NOTES
[facility] Medicine Services  INPATIENT PROGRESS NOTE    Date of Admission: 5/29/2017  Length of Stay: 0  Primary Care Physician: No Known Provider    Subjective   CC: Encephalopathy  HPI:    Denies pain or weakness, some ongoing mild confusion      Review Of Systems:   Review of Systems   Constitutional: Negative.    HENT: Negative.    Eyes: Negative.    Respiratory: Negative.    Cardiovascular: Negative.    Gastrointestinal: Negative.    Endocrine: Negative.    Genitourinary: Negative.    Musculoskeletal: Negative.    Allergic/Immunologic: Negative.    Neurological: Positive for confusion.   Hematological: Negative.    Psychiatric/Behavioral: Negative for agitation.         Objective      Temp:  [97.8 °F (36.6 °C)-98.5 °F (36.9 °C)] 98.3 °F (36.8 °C)  Heart Rate:  [65-90] 82  Resp:  [16] 16  BP: (119-155)/() 119/86  [unfilled]  No acute distress, sitting up on edge of bed, talkative, pleasant demeanor  Speech clear, but mild confusion continues   strong bilaterally  RRR, no murmur rub or gallop  CTAB  Abdomen soft, non tender, non distended, normal bowel sounds  No lower extremity cyanosis, clubbing or edema  Normal affect  Moves all extremities with ease    Results Review:    I have reviewed the labs, radiology results and diagnostic studies.      Results from last 7 days  Lab Units 06/01/17  0832   WBC 10*3/mm3 8.33   HEMOGLOBIN g/dL 15.8   PLATELETS 10*3/mm3 196       Results from last 7 days  Lab Units 06/01/17  0832   SODIUM mmol/L 131*   POTASSIUM mmol/L 3.9   CHLORIDE mmol/L 98*   TOTAL CO2 mmol/L 28.0   BUN mg/dL 13   CREATININE mg/dL 0.80   GLUCOSE mg/dL 125*   CALCIUM mg/dL 9.6       Culture Data: Cultures:    Blood Culture   Date Value Ref Range Status   05/24/2017 No growth at 5 days  Final   05/24/2017 No growth at 5 days  Final     Urine Culture   Date Value Ref Range Status   05/29/2017 No growth  Preliminary       Radiology Data:     I have reviewed the  medications.    amLODIPine 10 mg Oral Daily   aspirin 81 mg Oral Daily   atorvastatin 80 mg Oral Nightly   b complex-vitamin c-folic acid 1 tablet Oral Daily   carvedilol 6.25 mg Oral BID   clopidogrel 75 mg Oral Daily   heparin (porcine) 5,000 Units Subcutaneous Q8H   thiamine 100 mg Oral Daily       Assessment/Plan     Problem List  Hospital Problem List     * (Principal)Metabolic encephalopathy    Hyperlipidemia    HTN (hypertension)    CAD (coronary artery disease)    Brain aneurysm    TY (acute kidney injury)    Leukocytosis    Hypokalemia    Pyuria               Assessment/Plan:    Encephalopathy  - possibly secondary to 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  - check markers of inflammation and vasculitis as patient has both dilation of coronary and vertebrobasilar arteries; however, discussed with Neurointervention Dr Henriquez, and after conferring with Cardiology this was felt to be an incidental finding. See Dr Henriquez's last note from prior hospitalization for further details.   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  As patient no longer has housing here in [place], suspect the safest plan would be to discharge patient directly into the care of family or with his fiance. Would prefer if initial Neurologic follow up  could be here with a local Neurologist in 4-6 weeks, then arrangements could be made for follow up subsequently with an out of town (or out of state) provider.    Live Frazier MD   06/03/17   4:27 PM

## 2017-06-03 NOTE — PLAN OF CARE
Problem: Patient Care Overview (Adult)  Goal: Plan of Care Review    06/03/17 0423 06/03/17 1802   Coping/Psychosocial Response Interventions   Plan Of Care Reviewed With --  patient   Patient Care Overview   Progress progress toward functional goals as expected --

## 2017-06-04 LAB
ANION GAP SERPL CALCULATED.3IONS-SCNC: 6 MMOL/L (ref 3–11)
BUN BLD-MCNC: 12 MG/DL (ref 9–23)
BUN/CREAT SERPL: 17.1 (ref 7–25)
CALCIUM SPEC-SCNC: 9.5 MG/DL (ref 8.7–10.4)
CHLORIDE SERPL-SCNC: 103 MMOL/L (ref 99–109)
CO2 SERPL-SCNC: 27 MMOL/L (ref 20–31)
CREAT BLD-MCNC: 0.7 MG/DL (ref 0.6–1.3)
GFR SERPL CREATININE-BSD FRML MDRD: 115 ML/MIN/1.73
GLUCOSE BLD-MCNC: 103 MG/DL (ref 70–100)
POTASSIUM BLD-SCNC: 3.9 MMOL/L (ref 3.5–5.5)
SODIUM BLD-SCNC: 136 MMOL/L (ref 132–146)

## 2017-06-04 PROCEDURE — 99226 PR SBSQ OBSERVATION CARE/DAY 35 MINUTES: CPT | Performed by: INTERNAL MEDICINE

## 2017-06-04 PROCEDURE — 80048 BASIC METABOLIC PNL TOTAL CA: CPT | Performed by: INTERNAL MEDICINE

## 2017-06-04 PROCEDURE — 96372 THER/PROPH/DIAG INJ SC/IM: CPT

## 2017-06-04 PROCEDURE — G0378 HOSPITAL OBSERVATION PER HR: HCPCS

## 2017-06-04 PROCEDURE — 25010000002 HEPARIN (PORCINE) PER 1000 UNITS: Performed by: NURSE PRACTITIONER

## 2017-06-04 RX ADMIN — HEPARIN SODIUM 5000 UNITS: 5000 INJECTION, SOLUTION INTRAVENOUS; SUBCUTANEOUS at 14:00

## 2017-06-04 RX ADMIN — HEPARIN SODIUM 5000 UNITS: 5000 INJECTION, SOLUTION INTRAVENOUS; SUBCUTANEOUS at 05:31

## 2017-06-04 RX ADMIN — HYDROCODONE BITARTATE AND ACETAMINOPHEN 1 TABLET: 5; 325 TABLET ORAL at 02:20

## 2017-06-04 RX ADMIN — CLOPIDOGREL BISULFATE 75 MG: 75 TABLET ORAL at 08:19

## 2017-06-04 RX ADMIN — AMLODIPINE BESYLATE 10 MG: 10 TABLET ORAL at 08:19

## 2017-06-04 RX ADMIN — ASPIRIN 81 MG: 81 TABLET, COATED ORAL at 08:19

## 2017-06-04 RX ADMIN — HEPARIN SODIUM 5000 UNITS: 5000 INJECTION, SOLUTION INTRAVENOUS; SUBCUTANEOUS at 20:36

## 2017-06-04 RX ADMIN — CARVEDILOL 6.25 MG: 6.25 TABLET, FILM COATED ORAL at 17:44

## 2017-06-04 RX ADMIN — CARVEDILOL 6.25 MG: 6.25 TABLET, FILM COATED ORAL at 08:19

## 2017-06-04 RX ADMIN — HYDROCODONE BITARTATE AND ACETAMINOPHEN 1 TABLET: 5; 325 TABLET ORAL at 16:11

## 2017-06-04 RX ADMIN — Medication 100 MG: at 08:19

## 2017-06-04 RX ADMIN — ATORVASTATIN CALCIUM 80 MG: 40 TABLET, FILM COATED ORAL at 20:36

## 2017-06-04 RX ADMIN — HYDROCODONE BITARTATE AND ACETAMINOPHEN 1 TABLET: 5; 325 TABLET ORAL at 22:25

## 2017-06-04 RX ADMIN — Medication 1 TABLET: at 08:19

## 2017-06-04 NOTE — PLAN OF CARE
Problem: Patient Care Overview (Adult)  Goal: Plan of Care Review  Outcome: Ongoing (interventions implemented as appropriate)    06/04/17 1625   Coping/Psychosocial Response Interventions   Plan Of Care Reviewed With patient   Patient Care Overview   Progress no change   Outcome Evaluation   Outcome Summary/Follow up Plan Pt has been oriented X 4 this shift. Vitals WNL. Lortab given for posterior neck pain. Pt awaits discharge plan.          Problem: Confusion, Acute (Adult)  Goal: Identify Related Risk Factors and Signs and Symptoms  Outcome: Ongoing (interventions implemented as appropriate)    06/04/17 1625   Confusion, Acute   Related Risk Factors (Acute Confusion) neurological impairment;pain   Signs and Symptoms (Acute Confusion) memory disturbed;thought process diminished/disorganized;reasoning/planning disturbed;disorientation  (disoriented at times)       Goal: Cognitive/Functional Impairments Minimized  Outcome: Ongoing (interventions implemented as appropriate)    06/04/17 1625   Confusion, Acute (Adult)   Cognitive/Functional Impairments Minimized making progress toward outcome       Goal: Safety  Outcome: Ongoing (interventions implemented as appropriate)    06/04/17 1625   Confusion, Acute (Adult)   Safety making progress toward outcome         Problem: Pain, Acute (Adult)  Goal: Identify Related Risk Factors and Signs and Symptoms  Outcome: Ongoing (interventions implemented as appropriate)    06/04/17 1625   Pain, Acute   Related Risk Factors (Acute Pain) knowledge deficit;patient perception;positioning   Signs and Symptoms (Acute Pain) verbalization of pain descriptors       Goal: Acceptable Pain Control/Comfort Level  Outcome: Ongoing (interventions implemented as appropriate)    06/04/17 1625   Pain, Acute (Adult)   Acceptable Pain Control/Comfort Level making progress toward outcome

## 2017-06-04 NOTE — PLAN OF CARE
Problem: Patient Care Overview (Adult)  Goal: Plan of Care Review    06/04/17 0227   Coping/Psychosocial Response Interventions   Plan Of Care Reviewed With patient   Patient Care Overview   Progress progress toward functional goals as expected   Outcome Evaluation   Outcome Summary/Follow up Plan Pt alert and oriented x4, forgetful, intermittent confusion noted, VSS, c/o posterior neck pain PRN PO pain medication somewhat effective. CJD and CSF tests pending. CM working with son and SO regarding d/c arrangements. Pt is currently homeless, family and SO live in [place]. CM to follow up       Goal: Discharge Needs Assessment    05/30/17 1303 06/04/17 0227   Discharge Needs Assessment   Concerns To Be Addressed --  basic needs concerns;decision making concerns   Readmission Within The Last 30 Days current reason for admission unrelated to previous admission --    Equipment Needed After Discharge --  none   Discharge Disposition --  still a patient   Self-Care   Equipment Currently Used at Home --  none   Current Health   Anticipated Changes Related to Illness --  none         Problem: Confusion, Acute (Adult)  Intervention: Reduce Risk/Promote Restraint Free Environment    06/04/17 0220   Safety Interventions   Safety/Security Measures bed alarm refused   Environmental Safety Modification assistive device/personal items within reach   Restraint Interventions   Safety Promotion/Fall Prevention safety round/check completed;toileting scheduled         Goal: Identify Related Risk Factors and Signs and Symptoms    06/04/17 0227   Confusion, Acute   Related Risk Factors (Acute Confusion) neurological impairment;pain   Signs and Symptoms (Acute Confusion) memory disturbed;reasoning/planning disturbed       Goal: Cognitive/Functional Impairments Minimized    06/04/17 0227   Confusion, Acute (Adult)   Cognitive/Functional Impairments Minimized making progress toward outcome       Goal: Safety    06/04/17 0227   Confusion,  Acute (Adult)   Safety making progress toward outcome         Problem: Pain, Acute (Adult)  Intervention: Mutually Develop/Implement Acute Pain Management Plan    06/04/17 0220   Manage Acute Burn Pain   Pain Management Interventions medicated         Goal: Identify Related Risk Factors and Signs and Symptoms    06/03/17 0423   Pain, Acute   Related Risk Factors (Acute Pain) knowledge deficit;patient perception;psychosocial factor   Signs and Symptoms (Acute Pain) impaired thought process/concentration;verbalization of pain descriptors       Goal: Acceptable Pain Control/Comfort Level    06/04/17 0227   Pain, Acute (Adult)   Acceptable Pain Control/Comfort Level making progress toward outcome

## 2017-06-04 NOTE — PROGRESS NOTES
[facility] Medicine Services  INPATIENT PROGRESS NOTE    Date of Admission: 5/29/2017  Length of Stay: 0  Primary Care Physician: No Known Provider    Subjective   CC: Encephalopathy  HPI:    Denies pain or weakness. Eating well.      Review Of Systems:   Review of Systems   Constitutional: Negative.    HENT: Negative.    Eyes: Negative.    Respiratory: Negative.    Cardiovascular: Negative.    Gastrointestinal: Negative.    Endocrine: Negative.    Genitourinary: Negative.    Musculoskeletal: Negative.    Allergic/Immunologic: Negative.    Neurological: Positive for confusion.   Hematological: Negative.    Psychiatric/Behavioral: Negative for agitation.         Objective      Temp:  [97.9 °F (36.6 °C)-98.8 °F (37.1 °C)] 98.7 °F (37.1 °C)  Heart Rate:  [68-94] 70  Resp:  [16] 16  BP: (135-155)/() 145/93  [unfilled]  No acute distress, sitting up on edge of bed. Non toxic  Speech clear, but mild confusion continues   strong bilaterally  RRR, no murmur rub or gallop  CTAB  Abdomen soft, non tender, non distended, normal bowel sounds  No lower extremity cyanosis, clubbing or edema  Normal affect  Moves all extremities with ease    Results Review:    I have reviewed the labs, radiology results and diagnostic studies.      Results from last 7 days  Lab Units 06/01/17  0832   WBC 10*3/mm3 8.33   HEMOGLOBIN g/dL 15.8   PLATELETS 10*3/mm3 196       Results from last 7 days  Lab Units 06/04/17  0804   SODIUM mmol/L 136   POTASSIUM mmol/L 3.9   CHLORIDE mmol/L 103   TOTAL CO2 mmol/L 27.0   BUN mg/dL 12   CREATININE mg/dL 0.70   GLUCOSE mg/dL 103*   CALCIUM mg/dL 9.5       Culture Data: Cultures:    Blood Culture   Date Value Ref Range Status   05/24/2017 No growth at 5 days  Final   05/24/2017 No growth at 5 days  Final     Urine Culture   Date Value Ref Range Status   05/29/2017 No growth  Preliminary       Radiology Data:     I have reviewed the medications.    amLODIPine 10 mg Oral  Daily   aspirin 81 mg Oral Daily   atorvastatin 80 mg Oral Nightly   b complex-vitamin c-folic acid 1 tablet Oral Daily   carvedilol 6.25 mg Oral BID   clopidogrel 75 mg Oral Daily   heparin (porcine) 5,000 Units Subcutaneous Q8H   thiamine 100 mg Oral Daily       Assessment/Plan     Problem List  Hospital Problem List     * (Principal)Metabolic encephalopathy    Hyperlipidemia    HTN (hypertension)    CAD (coronary artery disease)    Brain aneurysm    TY (acute kidney injury)    Leukocytosis    Hypokalemia    Pyuria               Assessment/Plan:    Encephalopathy  - possibly secondary to 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  - check markers of inflammation and vasculitis as patient has both dilation of coronary and vertebrobasilar arteries; however, discussed with Neurointervention Dr Henriquez, and after conferring with Cardiology this was felt to be an incidental finding. See Dr Henriquez's last note from prior hospitalization for further details.   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  As patient no longer has housing here in [place], suspect the safest plan would be to discharge patient directly into the care of family or with his fiance. Would prefer if initial Neurologic follow up could be here with a local Neurologist  in 4-6 weeks, then arrangements could be made for follow up subsequently with an out of town (or out of state) provider.    Today, with patient's permission, I called patient's emmie Malloy (in [place]) at [phone]. She agrees that Mr Gamez needs to be in a monitored setting for the near and forseeable future. Mellissa says that she and her son work alternating shifts and would be able to provide near 24 hr monitoring and assistance for Mr Gamez. Mellissa plans to drive here Saturday and return to [place] with the patient on Sunday. She does request help with payment for the hotel stay (voucher).  Will ask case management to contact her regarding further arrangements.     I also suggested to the fihamida that initial neurological followup would initially be best with a [facility] provider in order to review outstanding labs (including for Creutzfeld Andry disease, which remains pending), and Mellissa is in agreement to bring the patient back in 4-6 weeks for the initial follow up visit. Thereafter care can be transferred to a provider in the [place] area. Case management to see if a voucher can be provided to help cover hotel expenses for this trip as well.    Patient in agreement with the above plan.    Total time spent face to face with the patient was 45 mintues.  25 minutes were spent counseling the patient and fihamida on possible diagnoses (ischemic stroke vs prion disease) and the discharge plan as outlined above.    Live Frazier MD   06/04/17   7:12 PM

## 2017-06-05 LAB
C-ANCA TITR SER IF: NORMAL TITER
C3 SERPL-MCNC: 151 MG/DL (ref 82–167)
MYELOPEROXIDASE AB SER-ACNC: <9 U/ML (ref 0–9)
P-ANCA ATYPICAL TITR SER IF: NORMAL TITER
P-ANCA TITR SER IF: NORMAL TITER
PROTEINASE3 AB SER IA-ACNC: <3.5 U/ML (ref 0–3.5)

## 2017-06-05 PROCEDURE — 25010000002 HEPARIN (PORCINE) PER 1000 UNITS: Performed by: NURSE PRACTITIONER

## 2017-06-05 PROCEDURE — 96372 THER/PROPH/DIAG INJ SC/IM: CPT

## 2017-06-05 PROCEDURE — G0378 HOSPITAL OBSERVATION PER HR: HCPCS

## 2017-06-05 PROCEDURE — 99225 PR SBSQ OBSERVATION CARE/DAY 25 MINUTES: CPT | Performed by: NURSE PRACTITIONER

## 2017-06-05 RX ADMIN — CARVEDILOL 6.25 MG: 6.25 TABLET, FILM COATED ORAL at 17:11

## 2017-06-05 RX ADMIN — HEPARIN SODIUM 5000 UNITS: 5000 INJECTION, SOLUTION INTRAVENOUS; SUBCUTANEOUS at 05:09

## 2017-06-05 RX ADMIN — HYDROCODONE BITARTATE AND ACETAMINOPHEN 1 TABLET: 5; 325 TABLET ORAL at 17:13

## 2017-06-05 RX ADMIN — HYDROCODONE BITARTATE AND ACETAMINOPHEN 1 TABLET: 5; 325 TABLET ORAL at 05:10

## 2017-06-05 RX ADMIN — ACETAMINOPHEN 650 MG: 325 TABLET, FILM COATED ORAL at 00:51

## 2017-06-05 RX ADMIN — ATORVASTATIN CALCIUM 80 MG: 40 TABLET, FILM COATED ORAL at 20:20

## 2017-06-05 RX ADMIN — HYDROCODONE BITARTATE AND ACETAMINOPHEN 1 TABLET: 5; 325 TABLET ORAL at 22:59

## 2017-06-05 RX ADMIN — ASPIRIN 81 MG: 81 TABLET, COATED ORAL at 08:54

## 2017-06-05 RX ADMIN — CARVEDILOL 6.25 MG: 6.25 TABLET, FILM COATED ORAL at 08:54

## 2017-06-05 RX ADMIN — Medication 1 TABLET: at 08:54

## 2017-06-05 RX ADMIN — HEPARIN SODIUM 5000 UNITS: 5000 INJECTION, SOLUTION INTRAVENOUS; SUBCUTANEOUS at 17:11

## 2017-06-05 RX ADMIN — AMLODIPINE BESYLATE 10 MG: 10 TABLET ORAL at 08:54

## 2017-06-05 RX ADMIN — Medication 100 MG: at 08:54

## 2017-06-05 RX ADMIN — CLOPIDOGREL BISULFATE 75 MG: 75 TABLET ORAL at 08:54

## 2017-06-05 NOTE — PROGRESS NOTES
"Adult Nutrition  Assessment/PES    Patient Name:  Mack Gamez  YOB: 1955  MRN: [id number]  Admit Date:  5/29/2017    Assessment Date:  6/5/2017        Reason for Assessment       06/05/17 0934    Reason for Assessment    Reason For Assessment/Visit length of stay    Time Spent (min) 15    Cardiac HTN;Dyslipidemia;CAD    Neurological AMS;Metabolic encephalopathy;CVA    Renal TY    Other diagnosis hypokalemia, brain aneurysm, pyuria; h/o sleep apnea, vit D def, tachycardia, kawasaki disease                Anthropometrics       06/05/17 0936    Anthropometrics    Height 170.2 cm (67\")    Weight 78.5 kg (173 lb)    Ideal Body Weight (IBW)    Ideal Body Weight (IBW), Male (kg) 68.1    % Ideal Body Weight 115.48    Body Mass Index (BMI)    BMI (kg/m2) 27.15            Labs/Tests/Procedures/Meds       06/05/17 0937    Labs/Tests/Procedures/Meds    Labs/Tests Review Reviewed                Nutrition Prescription Ordered       06/05/17 0937    Nutrition Prescription PO    Current PO Diet Regular    Common Modifiers Cardiac            Evaluation of Received Nutrient/Fluid Intake       06/05/17 0937    PO Evaluation    Number of Meals 11    % PO Intake 89              Problem/Interventions:        Problem 1       06/05/17 0937    Nutrition Diagnoses Problem 1    Problem 1 No Nutrition Diagnosis at this Time    Signs/Symptoms (evidenced by) --    Percent (%) intake recorded --    Over number of meals --                    Intervention Goal       06/05/17 0937    Intervention Goal    General Nutrition support treatment    PO Maintain intake            Nutrition Intervention       06/05/17 0938    Nutrition Intervention    RD/Tech Action Follow Tx progress;Care plan reviewd              Education/Evaluation       06/05/17 0938    Monitor/Evaluation    Monitor Per protocol        Comments:      Electronically signed by:  Raquel Ordaz RD  06/05/17 9:39 AM  "

## 2017-06-05 NOTE — PLAN OF CARE
Problem: Patient Care Overview (Adult)  Goal: Plan of Care Review  Outcome: Ongoing (interventions implemented as appropriate)    06/04/17 2039 06/05/17 0345   Coping/Psychosocial Response Interventions   Plan Of Care Reviewed With patient --    Patient Care Overview   Progress --  no change   Outcome Evaluation   Outcome Summary/Follow up Plan --  Patient rested well this shift. Has been oriented with vitals WNL. PRN medications given to neck pain, see MAR.          Problem: Confusion, Acute (Adult)  Goal: Identify Related Risk Factors and Signs and Symptoms  Outcome: Ongoing (interventions implemented as appropriate)  Goal: Cognitive/Functional Impairments Minimized  Outcome: Ongoing (interventions implemented as appropriate)  Goal: Safety  Outcome: Ongoing (interventions implemented as appropriate)    Problem: Pain, Acute (Adult)  Goal: Acceptable Pain Control/Comfort Level  Outcome: Ongoing (interventions implemented as appropriate)

## 2017-06-05 NOTE — PROGRESS NOTES
Continued Stay Note   [facility]     Patient Name: Mack Gamez  MRN: [id number]  Today's Date: 6/5/2017    Admit Date: 5/29/2017          Discharge Plan       06/05/17 1247    Case Management/Social Work Plan    Plan Social work spoke with Mr. Gamez's girlfriend Mellissa in [place] at [phone] and she is planning to come to the hospital this coming Saturday 6/10/17 to take Mr. Gamez back to [place] on Sunday 6/11/17.  Social work can assist with gas of $20.00 and she agreed that would be helpful.  She will stay in his hospital room on Saturday.    Patient/Family In Agreement With Plan yes              Discharge Codes     None            MALCOLM Ramon

## 2017-06-05 NOTE — PROGRESS NOTES
[facility] Medicine Services  INPATIENT PROGRESS NOTE    Date of Admission: 5/29/2017  Length of Stay: 0  Primary Care Physician: No Known Provider    Subjective   CC: f/u Encephalopathy      HPI:  She is seen resting upright on side of bed eating lunch.  He is in no acute distress.  No visitors at bedside.  He is awake, alert and pleasant.  Currently he is oriented ×4.  States he has episodes of forgetfulness.  Feels he is doing much better.  Tolerating diet.  Denies nausea, vomiting, chest pain, shortness of air.  States that his plan is to go back to [place] with his fiancée is coming weekend where he will live with her.  No new issues.      Review of Systems  Constitutional: Negative.   HENT: Negative.   Respiratory: Negative.   Cardiovascular: Negative.   Gastrointestinal: Negative.   Genitourinary: Negative.   Musculoskeletal: Negative.   Neurological: Positive for mild confusion.   Hematological: Negative.   Psychiatric/Behavioral: Negative for agitation.        Objective      Temp:  [97.8 °F (36.6 °C)-98.7 °F (37.1 °C)] 98.2 °F (36.8 °C)  Heart Rate:  [70-87] 79  Resp:  [16] 16  BP: (130-145)/(93-97) 131/94     Physical Exam  No acute distress, sitting up on edge of bed.  No visitors at bedside  Speech clear, but mild confusion intermittently noted to some details of his events here.  Otherwise is currently alert and oriented seemingly ×4.  Obeys all commands.   strong bilaterally  RRR, no murmur rub or gallop  CTAB A/P. No adventitious sounds   Abdomen soft, non tender, non distended, normal bowel sounds  No lower extremity cyanosis, clubbing or edema  Normal affect, calm and cooperative  Moves all extremities with ease    Results Review:    I have reviewed the labs, radiology results and diagnostic studies.      Results from last 7 days  Lab Units 06/01/17  0832   WBC 10*3/mm3 8.33   HEMOGLOBIN g/dL 15.8   PLATELETS 10*3/mm3 196       Results from last 7 days  Lab Units  06/04/17  0804   SODIUM mmol/L 136   POTASSIUM mmol/L 3.9   CHLORIDE mmol/L 103   TOTAL CO2 mmol/L 27.0   BUN mg/dL 12   CREATININE mg/dL 0.70   GLUCOSE mg/dL 103*   CALCIUM mg/dL 9.5       Culture Data: Cultures:    Urine Culture   Date Value Ref Range Status   05/29/2017 No growth at 2 days  Final       Radiology Data:   Imaging Results (last 24 hours)     ** No results found for the last 24 hours. **            I have reviewed the medications.  Scheduled Meds:  amLODIPine 10 mg Oral Daily   aspirin 81 mg Oral Daily   atorvastatin 80 mg Oral Nightly   b complex-vitamin c-folic acid 1 tablet Oral Daily   carvedilol 6.25 mg Oral BID   clopidogrel 75 mg Oral Daily   heparin (porcine) 5,000 Units Subcutaneous Q8H   thiamine 100 mg Oral Daily     Continuous Infusions:  sodium chloride 100 mL/hr Last Rate: 100 mL/hr (05/30/17 4440)     PRN Meds:.•  acetaminophen  •  HYDROcodone-acetaminophen  •  hydrOXYzine  •  ondansetron **OR** ondansetron  •  potassium chloride **OR** potassium chloride **OR** potassium chloride      Assessment/Plan     Problem List  Hospital Problem List     * (Principal)Metabolic encephalopathy    Hyperlipidemia    HTN (hypertension)    CAD (coronary artery disease)    Brain aneurysm    TY (acute kidney injury)    Leukocytosis    Hypokalemia    Pyuria           Assessment/Plan:     Encephalopathy  - possibly secondary to 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  - check markers of inflammation and vasculitis as patient has both dilation of coronary and vertebrobasilar arteries; however, discussed with Neurointervention Dr Henriquez, and after conferring with Cardiology this was felt to be an incidental finding. See Dr Henriquez's last note  from prior hospitalization for further details.   - will need robust follow up plan with outpatient Neurology  - Repeat MRI Brain shows expected changes from what appears radiographically to have been a posterior circulation infarct  - asa, plavix and statin     UTI  -s/p 5 days abx - Urine culture no growth     HTN  - allow some permissive HTN     Dyslipidemia     CAD  - on asa and plavix     DVT prophylaxis: heparin     Discharge Planning: I expect patient to be discharged with family in 1-3 Days. As patient no longer has housing here in [place], suspect the safest plan would be to discharge patient directly into the care of family or with his fiance. Would prefer if initial Neurologic follow up could be here with a local Neurologist in 4-6 weeks, then arrangements could be made for follow up subsequently with an out of town (or out of state) provider.     Yesterday, with patient's permission, MD called patient's emmie Malloy (in [place]) at [phone]. She agrees that Mr Gamez needs to be in a monitored setting for the near and forseeable future. Mellissa says that she and her son work alternating shifts and would be able to provide near 24 hr monitoring and assistance for Mr Gamez. Mellissa plans to drive here Saturday and return to [place] with the patient on Sunday. She does request help with payment for the hotel stay (voucher).  CM working on assistance if possible.    MD also suggested to the fiance that initial neurological followup would initially be best with a [facility] provider in order to review outstanding labs (including for Creutzfeld Andry disease, which remains pending), and Mellissa is in agreement to bring the patient back in 4-6 weeks for the initial follow up visit. Thereafter care can be transferred to a provider in the [place] area. Case management to see if a voucher can be provided to help cover hotel expenses for this trip as well.     Patient in agreement with the above  plan.    Today the  spoke with patient's fiancéAnastasia in [place].  She confirmed that she does plan to come to [place] to get the patient this coming Saturday.  She plans to stay overnight in the hospital room and transferred for patient back to her home in [place] Sunday.  Case management has agreed to assist with some gas money as per their note, however no other available resources at this time.  As patient has no other discharge ability, he will wait his girlfriend to arrive Saturday for transfer home as noted.     Sapna Morrison, APRN   06/05/17   2:09 PM

## 2017-06-05 NOTE — PLAN OF CARE
Problem: Patient Care Overview (Adult)  Goal: Plan of Care Review  Outcome: Ongoing (interventions implemented as appropriate)    06/05/17 2449   Coping/Psychosocial Response Interventions   Plan Of Care Reviewed With patient   Patient Care Overview   Progress progress toward functional goals is gradual   Outcome Evaluation   Outcome Summary/Follow up Plan Patient A/O x 3 most of today, some intermittent confusion, discussed d/c plans, patient verbalized understanding         Problem: Confusion, Acute (Adult)  Goal: Identify Related Risk Factors and Signs and Symptoms  Outcome: Ongoing (interventions implemented as appropriate)  Goal: Cognitive/Functional Impairments Minimized  Outcome: Ongoing (interventions implemented as appropriate)  Goal: Safety  Outcome: Ongoing (interventions implemented as appropriate)    Problem: Pain, Acute (Adult)  Goal: Identify Related Risk Factors and Signs and Symptoms  Outcome: Ongoing (interventions implemented as appropriate)  Goal: Acceptable Pain Control/Comfort Level  Outcome: Ongoing (interventions implemented as appropriate)

## 2017-06-06 PROCEDURE — 99225 PR SBSQ OBSERVATION CARE/DAY 25 MINUTES: CPT | Performed by: NURSE PRACTITIONER

## 2017-06-06 PROCEDURE — 96372 THER/PROPH/DIAG INJ SC/IM: CPT

## 2017-06-06 PROCEDURE — G0378 HOSPITAL OBSERVATION PER HR: HCPCS

## 2017-06-06 PROCEDURE — 25010000002 HEPARIN (PORCINE) PER 1000 UNITS: Performed by: NURSE PRACTITIONER

## 2017-06-06 RX ORDER — HYDRALAZINE HYDROCHLORIDE 20 MG/ML
10 INJECTION INTRAMUSCULAR; INTRAVENOUS EVERY 8 HOURS PRN
Status: DISCONTINUED | OUTPATIENT
Start: 2017-06-06 | End: 2017-06-11 | Stop reason: HOSPADM

## 2017-06-06 RX ADMIN — Medication 100 MG: at 10:22

## 2017-06-06 RX ADMIN — ASPIRIN 81 MG: 81 TABLET, COATED ORAL at 09:09

## 2017-06-06 RX ADMIN — CARVEDILOL 6.25 MG: 6.25 TABLET, FILM COATED ORAL at 17:26

## 2017-06-06 RX ADMIN — ATORVASTATIN CALCIUM 80 MG: 40 TABLET, FILM COATED ORAL at 21:52

## 2017-06-06 RX ADMIN — HEPARIN SODIUM 5000 UNITS: 5000 INJECTION, SOLUTION INTRAVENOUS; SUBCUTANEOUS at 14:43

## 2017-06-06 RX ADMIN — CARVEDILOL 6.25 MG: 6.25 TABLET, FILM COATED ORAL at 09:09

## 2017-06-06 RX ADMIN — Medication 1 TABLET: at 09:07

## 2017-06-06 RX ADMIN — HYDROCODONE BITARTATE AND ACETAMINOPHEN 1 TABLET: 5; 325 TABLET ORAL at 18:00

## 2017-06-06 RX ADMIN — AMLODIPINE BESYLATE 10 MG: 10 TABLET ORAL at 09:07

## 2017-06-06 RX ADMIN — HEPARIN SODIUM 5000 UNITS: 5000 INJECTION, SOLUTION INTRAVENOUS; SUBCUTANEOUS at 21:53

## 2017-06-06 RX ADMIN — HYDROCODONE BITARTATE AND ACETAMINOPHEN 1 TABLET: 5; 325 TABLET ORAL at 05:29

## 2017-06-06 RX ADMIN — CLOPIDOGREL BISULFATE 75 MG: 75 TABLET ORAL at 09:07

## 2017-06-06 NOTE — PLAN OF CARE
Problem: Patient Care Overview (Adult)  Goal: Plan of Care Review  Outcome: Ongoing (interventions implemented as appropriate)    06/05/17 2019 06/06/17 0428   Coping/Psychosocial Response Interventions   Plan Of Care Reviewed With patient --    Patient Care Overview   Progress --  no change   Outcome Evaluation   Outcome Summary/Follow up Plan --  Patient rested well this shift. Vitals remain stable- diastolic BP still staying in the 90s, per reported baseline. PRN medications given for neck pain, see MAR. Alert and oriented throughout the shift and no periods of confusion.         Problem: Confusion, Acute (Adult)  Goal: Cognitive/Functional Impairments Minimized  Outcome: Ongoing (interventions implemented as appropriate)    Problem: Pain, Acute (Adult)  Goal: Acceptable Pain Control/Comfort Level  Outcome: Ongoing (interventions implemented as appropriate)

## 2017-06-06 NOTE — PROGRESS NOTES
[facility] Medicine Services  INPATIENT PROGRESS NOTE    Date of Admission: 5/29/2017  Length of Stay: 0  Primary Care Physician: No Known Provider    Subjective   CC: f/u Encephalopathy    HPI:  Seen resting upright in bed in no acute distress.  No family at bedside.  Reports that he is feeling good today.  Denies any nausea, vomiting, chest pain, shortness of air.  No edema fever or chills.  Feels that his confusion has significantly improved.  Tolerating diet.  States he is just awaiting his fiancée to come get him.  No new issues.      Review of Systems  Constitutional: Negative.   HENT: Negative.   Respiratory: Negative.   Cardiovascular: Negative.   Gastrointestinal: Negative.   Genitourinary: Negative.   Musculoskeletal: Negative.   Neurological: Positive for mild confusion (improved).   Hematological: Negative.   Psychiatric/Behavioral: Negative for agitation    Objective      Temp:  [98.2 °F (36.8 °C)-99.2 °F (37.3 °C)] 98.7 °F (37.1 °C)  Heart Rate:  [73-81] 80  Resp:  [16-18] 16  BP: (134-149)/() 142/92     Physical Exam  No acute distress, sitting up in bed.  No visitors at bedside  Speech clear, no clearly obvious confusion noted currently. Otherwise is currently alert and oriented seemingly ×4. Obeys all commands.   and pushes equal and strong bilaterally  RRR, no murmur rub or gallop  CTAB A/P. No adventitious sounds   Abdomen soft, non tender, non distended, normal bowel sounds  No lower extremity cyanosis, clubbing or edema  Normal affect, Pleasant, calm and cooperative  Moves all extremities with ease       Results Review:    I have reviewed the labs, radiology results and diagnostic studies.      Results from last 7 days  Lab Units 06/01/17  0832   WBC 10*3/mm3 8.33   HEMOGLOBIN g/dL 15.8   PLATELETS 10*3/mm3 196       Results from last 7 days  Lab Units 06/04/17  0804   SODIUM mmol/L 136   POTASSIUM mmol/L 3.9   CHLORIDE mmol/L 103   TOTAL CO2 mmol/L 27.0    BUN mg/dL 12   CREATININE mg/dL 0.70   GLUCOSE mg/dL 103*   CALCIUM mg/dL 9.5         Radiology Data:   Imaging Results (last 24 hours)     ** No results found for the last 24 hours. **            I have reviewed the medications.  Scheduled Meds:  amLODIPine 10 mg Oral Daily   aspirin 81 mg Oral Daily   atorvastatin 80 mg Oral Nightly   b complex-vitamin c-folic acid 1 tablet Oral Daily   carvedilol 6.25 mg Oral BID   clopidogrel 75 mg Oral Daily   heparin (porcine) 5,000 Units Subcutaneous Q8H   thiamine 100 mg Oral Daily     Continuous Infusions:  sodium chloride 100 mL/hr Last Rate: 100 mL/hr (05/30/17 2981)     PRN Meds:.•  acetaminophen  •  hydrALAZINE  •  HYDROcodone-acetaminophen  •  hydrOXYzine  •  ondansetron **OR** ondansetron  •  potassium chloride **OR** potassium chloride **OR** potassium chloride      Assessment/Plan     Problem List  Hospital Problem List     * (Principal)Metabolic encephalopathy    Hyperlipidemia    HTN (hypertension)    CAD (coronary artery disease)    Brain aneurysm    TY (acute kidney injury)    Leukocytosis    Hypokalemia    Pyuria        Assessment/Plan:      Encephalopathy  - possibly secondary to 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  - check markers of inflammation and vasculitis as patient has both dilation of coronary and vertebrobasilar arteries; however, discussed with Neurointervention Dr Henriquez, and after conferring with Cardiology this was felt to be an incidental finding. See Dr Henriquez's last note from prior hospitalization for further details.   - will need robust follow up plan with outpatient Neurology  - Repeat MRI Brain shows expected changes from what appears radiographically to have been a  posterior circulation infarct  - on asa, plavix and statin      UTI  -s/p 5 days abx -dcd  - Urine culture no growth      HTN  -allow some permissive HTN  -low dose prn BP med for SBP >170 or DBP >105      Dyslipidemia      CAD  - on asa and plavix      DVT prophylaxis: heparin      Discharge Planning: I expect patient to be discharged with family on Saturday as per CM. As patient no longer has housing here in [place], suspect the safest plan would be to discharge patient directly into the care of family or with his fiance. Would prefer if initial Neurologic follow up could be here with a local Neurologist in 4-6 weeks, then arrangements could be made for follow up subsequently with an out of town (or out of state) provider.      As per chart review, with patient's permission, MD called patient's emmie Malloy (in [place]) at [phone]. She agrees that Mr Gamez needs to be in a monitored setting for the near and forseeable future. Mellissa says that she and her son work alternating shifts and would be able to provide near 24 hr monitoring and assistance for Mr Gamez. Mellissa plans to drive here Saturday and return to [place] with the patient on Sunday. She does request help with payment for the hotel stay (voucher). CM working on assistance if possible.    MD also suggested to the emmie that initial neurological followup would initially be best with a [facility] provider in order to review outstanding labs (including for Creutzfeld Andry disease, which remains pending), and Mellissa is in agreement to bring the patient back in 4-6 weeks for the initial follow up visit. Thereafter care can be transferred to a provider in the [place] area. Case management to see if a voucher can be provided to help cover hotel expenses for this trip as well.      Patient in agreement with the above plan.     Yesterday 6/5 the  spoke with patient's Anastasia nava in [place]. She confirmed that she does plan to come  to [place] to get the patient this coming Saturday. She plans to stay overnight in the hospital room and transferred for patient back to her home in [place] Sunday. Case management has agreed to assist with some gas money as per their note, however no other available resources at this time. As patient has no other discharge ability, he will wait his girlfriend to arrive Saturday for transfer home as noted.     Continue to monitor.  No new changes today.  Plan is still to discharge this coming weekend with his fiancé as above.  No new labs for a.mOsmany Morrison, MECHE   06/06/17   10:26 AM

## 2017-06-06 NOTE — PLAN OF CARE
Problem: Patient Care Overview (Adult)  Goal: Plan of Care Review  Outcome: Ongoing (interventions implemented as appropriate)    06/06/17 6735   Coping/Psychosocial Response Interventions   Plan Of Care Reviewed With patient   Patient Care Overview   Progress improving   Outcome Evaluation   Outcome Summary/Follow up Plan Patient alert and oriented today without complaints of pain or discomfort         Problem: Confusion, Acute (Adult)  Goal: Cognitive/Functional Impairments Minimized  Outcome: Ongoing (interventions implemented as appropriate)  Goal: Safety  Outcome: Ongoing (interventions implemented as appropriate)    Problem: Pain, Acute (Adult)  Goal: Acceptable Pain Control/Comfort Level  Outcome: Ongoing (interventions implemented as appropriate)

## 2017-06-06 NOTE — PAYOR COMM NOTE
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    Respiratory: Negative.    Cardiovascular: Negative.    Gastrointestinal: Negative.    Endocrine: Negative.    Genitourinary: Negative.    Musculoskeletal: Negative.    Allergic/Immunologic: Negative.    Neurological: Positive for confusion.   Hematological: Negative.    Psychiatric/Behavioral: Negative for agitation.         Objective      Temp:  [97.8 °F (36.6 °C)-98.3 °F (36.8 °C)] 98.3 °F (36.8 °C)  Heart Rate:  [] 98  Resp:  [14-18] 16  BP: ()/() 146/97  [unfilled]  No acute distress  Speech clear, but mild confusion (knows year, but cannot name place; also his description of events leading to his readmission wanders a bit and somewhat incoherent)  Alert, sitting up on the edge of the bed  RRR, no murmur rub or gallop  CTAB  Abdomen soft, non tender, non distended, normal bowel sounds  No lower extremity cyanosis, clubbing or edema  Normal affect  Moves all extremities with ease      Results Review:    I  have reviewed the labs, radiology results and diagnostic studies.      Results from last 7 days  Lab Units 05/30/17  0527   WBC 10*3/mm3 11.07*   HEMOGLOBIN g/dL 15.4   PLATELETS 10*3/mm3 217       Results from last 7 days  Lab Units 05/30/17  0527   SODIUM mmol/L 139   POTASSIUM mmol/L 3.1*   CHLORIDE mmol/L 105   TOTAL CO2 mmol/L 26.0   BUN mg/dL 29*   CREATININE mg/dL 1.00   GLUCOSE mg/dL 126*   CALCIUM mg/dL 9.5       Culture Data: Cultures:    Blood Culture   Date Value Ref Range Status   05/24/2017 No growth at 5 days  Final   05/24/2017 No growth at 5 days  Final     Urine Culture   Date Value Ref Range Status   05/29/2017 No growth  Preliminary       Radiology Data:     I have reviewed the medications.    amLODIPine 10 mg Oral Daily   aspirin 81 mg Oral Daily   atorvastatin 80 mg Oral Nightly   carvedilol 6.25 mg Oral BID   ceftriaxone 1 g Intravenous Q24H   clopidogrel 75 mg Oral Daily   heparin (porcine) 5,000 Units Subcutaneous Q8H       Assessment/Plan     Problem List  Hospital Problem List     * (Principal)Metabolic encephalopathy    Hyperlipidemia    HTN (hypertension)    CAD (coronary artery disease)    Brain aneurysm    TY (acute kidney injury)    Leukocytosis    Hypokalemia    Pyuria               Assessment/Plan:    Encephalopathy  - concern for CJD -- CSF studies still pending  - will need repeat MRI brain in 3 months   - discussed with Neurology Dr Coon    UTI  - continue rocephin, follow cultures    Dolichoetasia of vertebrobasilar system  - no stenoses or flow limiting lesions on cerebral angiogram    HTN    Dyslipidemia    CAD  - on asa and plavix        DVT prophylaxis: heparin      Discharge Planning: I expect patient to be discharged to ? in ? Days. Will discuss with GEENA Frazier MD   05/30/17   12:36 PM         Electronically signed by Live Frazier MD at 5/30/2017 12:59 PM      Live Frazier MD at 5/31/2017  4:55 PM  Version 1 of 1             [facility]  Hospital Medicine Services  INPATIENT PROGRESS NOTE    Date of Admission: 5/29/2017  Length of Stay: 0  Primary Care Physician: No Known Provider    Subjective   CC: Encephalopathy  HPI:    No current pain. Talking on phone with family today.        Review Of Systems:   Review of Systems   Constitutional: Negative.    HENT: Negative.    Eyes: Negative.    Respiratory: Negative.    Cardiovascular: Negative.    Gastrointestinal: Negative.    Endocrine: Negative.    Genitourinary: Negative.    Musculoskeletal: Negative.    Allergic/Immunologic: Negative.    Neurological: Positive for confusion.   Hematological: Negative.    Psychiatric/Behavioral: Negative for agitation.         Objective      Temp:  [98.3 °F (36.8 °C)-98.7 °F (37.1 °C)] 98.3 °F (36.8 °C)  Heart Rate:  [74-77] 77  Resp:  [16] 16  BP: (137-140)/(86-90) 137/90  [unfilled]  No acute distress, sitting up in bed  Speech clear, but mild confusion   Alert, sitting up on the edge of the bed  RRR, no murmur rub or gallop  CTAB  Abdomen soft, non tender, non distended, normal bowel sounds  No lower extremity cyanosis, clubbing or edema  Normal affect  Moves all extremities with ease  Calm demeanor      Results Review:    I have reviewed the labs, radiology results and diagnostic studies.      Results from last 7 days  Lab Units 05/30/17  0527   WBC 10*3/mm3 11.07*   HEMOGLOBIN g/dL 15.4   PLATELETS 10*3/mm3 217       Results from last 7 days  Lab Units 05/31/17  0412 05/30/17  0527   SODIUM mmol/L  --  139   POTASSIUM mmol/L 3.9 3.1*   CHLORIDE mmol/L  --  105   TOTAL CO2 mmol/L  --  26.0   BUN mg/dL  --  29*   CREATININE mg/dL  --  1.00   GLUCOSE mg/dL  --  126*   CALCIUM mg/dL  --  9.5       Culture Data: Cultures:    Blood Culture   Date Value Ref Range Status   05/24/2017 No growth at 5 days  Final   05/24/2017 No growth at 5 days  Final     Urine Culture   Date Value Ref Range Status   05/29/2017 No growth  Preliminary       Radiology Data:     I have  reviewed the medications.    amLODIPine 10 mg Oral Daily   aspirin 81 mg Oral Daily   atorvastatin 80 mg Oral Nightly   carvedilol 6.25 mg Oral BID   ceftriaxone 1 g Intravenous Q24H   clopidogrel 75 mg Oral Daily   heparin (porcine) 5,000 Units Subcutaneous Q8H       Assessment/Plan     Problem List  Hospital Problem List     * (Principal)Metabolic encephalopathy    Hyperlipidemia    HTN (hypertension)    CAD (coronary artery disease)    Brain aneurysm    TY (acute kidney injury)    Leukocytosis    Hypokalemia    Pyuria               Assessment/Plan:    Encephalopathy  - concern for CJD -- CSF studies still pending  - will need repeat MRI brain in 3 months   - discussed with Neurology Dr Coon yesterday, will discuss further in am  - discussed discharge planning with  today.    UTI  - continue rocephin, follow cultures    Dolichoetasia of vertebrobasilar system  - no stenoses or flow limiting lesions on cerebral angiogram    HTN    Dyslipidemia    CAD  - on asa and plavix        DVT prophylaxis: heparin      Discharge Planning: I expect patient to be discharged to ? in ? Days.     Live Frazier MD   05/31/17   4:56 PM         Electronically signed by Live Frazier MD at 5/31/2017  4:58 PM      Live Frazier MD at 6/1/2017 10:06 PM  Version 1 of 1             [facility] Medicine Services  INPATIENT PROGRESS NOTE    Date of Admission: 5/29/2017  Length of Stay: 0  Primary Care Physician: No Known Provider    Subjective   CC: Encephalopathy  HPI:    Denies pain or weakness.      Review Of Systems:   Review of Systems   Constitutional: Negative.    HENT: Negative.    Eyes: Negative.    Respiratory: Negative.    Cardiovascular: Negative.    Gastrointestinal: Negative.    Endocrine: Negative.    Genitourinary: Negative.    Musculoskeletal: Negative.    Allergic/Immunologic: Negative.    Neurological: Positive for confusion.   Hematological: Negative.     Psychiatric/Behavioral: Negative for agitation.         Objective      Temp:  [97.1 °F (36.2 °C)-98 °F (36.7 °C)] 97.9 °F (36.6 °C)  Heart Rate:  [84-87] 86  Resp:  [16-18] 16  BP: (115-151)/() 151/106  [unfilled]  No acute distress, sitting up on edge of bed  Speech clear, but mild confusion   RRR, no murmur rub or gallop  CTAB  Abdomen soft, non tender, non distended, normal bowel sounds  No lower extremity cyanosis, clubbing or edema  Normal affect  Moves all extremities with ease  Calm demeanor      Results Review:    I have reviewed the labs, radiology results and diagnostic studies.      Results from last 7 days  Lab Units 06/01/17  0832   WBC 10*3/mm3 8.33   HEMOGLOBIN g/dL 15.8   PLATELETS 10*3/mm3 196       Results from last 7 days  Lab Units 06/01/17  0832   SODIUM mmol/L 131*   POTASSIUM mmol/L 3.9   CHLORIDE mmol/L 98*   TOTAL CO2 mmol/L 28.0   BUN mg/dL 13   CREATININE mg/dL 0.80   GLUCOSE mg/dL 125*   CALCIUM mg/dL 9.6       Culture Data: Cultures:    Blood Culture   Date Value Ref Range Status   05/24/2017 No growth at 5 days  Final   05/24/2017 No growth at 5 days  Final     Urine Culture   Date Value Ref Range Status   05/29/2017 No growth  Preliminary       Radiology Data:     I have reviewed the medications.    amLODIPine 10 mg Oral Daily   aspirin 81 mg Oral Daily   atorvastatin 80 mg Oral Nightly   carvedilol 6.25 mg Oral BID   ceftriaxone 1 g Intravenous Q24H   clopidogrel 75 mg Oral Daily   heparin (porcine) 5,000 Units Subcutaneous Q8H       Assessment/Plan     Problem List  Hospital Problem List     * (Principal)Metabolic encephalopathy    Hyperlipidemia    HTN (hypertension)    CAD (coronary artery disease)    Brain aneurysm    TY (acute kidney injury)    Leukocytosis    Hypokalemia    Pyuria               Assessment/Plan:    Encephalopathy  - discussed with Neurology Dr Coon - Consider repeat MRI while hospitalized  - concern for CJD -- CSF studies still pending  - check  markers of inflammation and vasculitis as patient has both dilation of coronary and vertebrobasilar arteries   - will need robust follow up plan with outpatient Neurology    UTI  - continue rocephin, follow cultures - if remain no growth will dc abx soon. Doubt infection playing    Dolichoetasia of vertebrobasilar system  - no stenoses or flow limiting lesions on cerebral angiogram    HTN    Dyslipidemia    CAD  - on asa and plavix      DVT prophylaxis: heparin    With patient permission, discussed case with patient's son Isra by phone today.      Discharge Planning: I expect patient to be discharged to ? in ? Days.     Live Frazier MD   06/01/17   10:06 PM         Electronically signed by Live Frazier MD at 6/1/2017 10:11 PM      Live Frazier MD at 6/2/2017  6:08 PM  Version 1 of 1             [facility] Medicine Services  INPATIENT PROGRESS NOTE    Date of Admission: 5/29/2017  Length of Stay: 0  Primary Care Physician: No Known Provider    Subjective   CC: Encephalopathy  HPI:    Denies pain or weakness, looking forward to hospital discharge      Review Of Systems:   Review of Systems   Constitutional: Negative.    HENT: Negative.    Eyes: Negative.    Respiratory: Negative.    Cardiovascular: Negative.    Gastrointestinal: Negative.    Endocrine: Negative.    Genitourinary: Negative.    Musculoskeletal: Negative.    Allergic/Immunologic: Negative.    Neurological: Positive for confusion.   Hematological: Negative.    Psychiatric/Behavioral: Negative for agitation.         Objective      Temp:  [97.8 °F (36.6 °C)-98.9 °F (37.2 °C)] 97.8 °F (36.6 °C)  Heart Rate:  [76-88] 85  Resp:  [16-17] 17  BP: (131-151)/() 140/105  [unfilled]  No acute distress, sitting up on edge of bed  Speech clear, but mild confusion continues   strong  RRR, no murmur rub or gallop  CTAB  Abdomen soft, non tender, non distended, normal bowel sounds  No lower extremity cyanosis, clubbing or  edema  Normal affect  Moves all extremities with ease  Calm demeanor      Results Review:    I have reviewed the labs, radiology results and diagnostic studies.      Results from last 7 days  Lab Units 06/01/17  0832   WBC 10*3/mm3 8.33   HEMOGLOBIN g/dL 15.8   PLATELETS 10*3/mm3 196       Results from last 7 days  Lab Units 06/01/17  0832   SODIUM mmol/L 131*   POTASSIUM mmol/L 3.9   CHLORIDE mmol/L 98*   TOTAL CO2 mmol/L 28.0   BUN mg/dL 13   CREATININE mg/dL 0.80   GLUCOSE mg/dL 125*   CALCIUM mg/dL 9.6       Culture Data: Cultures:    Blood Culture   Date Value Ref Range Status   05/24/2017 No growth at 5 days  Final   05/24/2017 No growth at 5 days  Final     Urine Culture   Date Value Ref Range Status   05/29/2017 No growth  Preliminary       Radiology Data:     I have reviewed the medications.    amLODIPine 10 mg Oral Daily   aspirin 81 mg Oral Daily   atorvastatin 80 mg Oral Nightly   b complex-vitamin c-folic acid 1 tablet Oral Daily   carvedilol 6.25 mg Oral BID   ceftriaxone 1 g Intravenous Q24H   clopidogrel 75 mg Oral Daily   heparin (porcine) 5,000 Units Subcutaneous Q8H   thiamine 100 mg Oral Daily       Assessment/Plan     Problem List  Hospital Problem List     * (Principal)Metabolic encephalopathy    Hyperlipidemia    HTN (hypertension)    CAD (coronary artery disease)    Brain aneurysm    TY (acute kidney injury)    Leukocytosis    Hypokalemia    Pyuria               Assessment/Plan:    Encephalopathy  - possibly secondary to 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  - check markers of inflammation and vasculitis as patient has both dilation of coronary and vertebrobasilar arteries; however, discussed with Neurointervention   Martinez, and after conferring with Cardiology this was felt to be an incidental finding. See Dr Henriquez's last note from prior hospitalization for further details.   - will need robust follow up plan with outpatient Neurology  - Repeat MRI Brain today    UTI  -s/p 5 days abx - Urine culture no growth, will dc further abx    HTN  - allow some permissive HTN    Dyslipidemia    CAD  - on asa and plavix    DVT prophylaxis: heparin    Discharge Planning: I expect patient to be discharged with family in ? Days.     Live Frazier MD   06/02/17   6:09 PM         Electronically signed by Live Frazier MD at 6/2/2017  6:16 PM      Live Frazier MD at 6/3/2017  4:26 PM  Version 1 of 1             [facility] Medicine Services  INPATIENT PROGRESS NOTE    Date of Admission: 5/29/2017  Length of Stay: 0  Primary Care Physician: No Known Provider    Subjective   CC: Encephalopathy  HPI:    Denies pain or weakness, some ongoing mild confusion      Review Of Systems:   Review of Systems   Constitutional: Negative.    HENT: Negative.    Eyes: Negative.    Respiratory: Negative.    Cardiovascular: Negative.    Gastrointestinal: Negative.    Endocrine: Negative.    Genitourinary: Negative.    Musculoskeletal: Negative.    Allergic/Immunologic: Negative.    Neurological: Positive for confusion.   Hematological: Negative.    Psychiatric/Behavioral: Negative for agitation.         Objective      Temp:  [97.8 °F (36.6 °C)-98.5 °F (36.9 °C)] 98.3 °F (36.8 °C)  Heart Rate:  [65-90] 82  Resp:  [16] 16  BP: (119-155)/() 119/86  [unfilled]  No acute distress, sitting up on edge of bed, talkative, pleasant demeanor  Speech clear, but mild confusion continues   strong bilaterally  RRR, no murmur rub or gallop  CTAB  Abdomen soft, non tender, non distended, normal bowel sounds  No lower extremity cyanosis, clubbing or edema  Normal affect  Moves all extremities with ease    Results Review:    I have reviewed the  labs, radiology results and diagnostic studies.      Results from last 7 days  Lab Units 06/01/17  0832   WBC 10*3/mm3 8.33   HEMOGLOBIN g/dL 15.8   PLATELETS 10*3/mm3 196       Results from last 7 days  Lab Units 06/01/17  0832   SODIUM mmol/L 131*   POTASSIUM mmol/L 3.9   CHLORIDE mmol/L 98*   TOTAL CO2 mmol/L 28.0   BUN mg/dL 13   CREATININE mg/dL 0.80   GLUCOSE mg/dL 125*   CALCIUM mg/dL 9.6       Culture Data: Cultures:    Blood Culture   Date Value Ref Range Status   05/24/2017 No growth at 5 days  Final   05/24/2017 No growth at 5 days  Final     Urine Culture   Date Value Ref Range Status   05/29/2017 No growth  Preliminary       Radiology Data:     I have reviewed the medications.    amLODIPine 10 mg Oral Daily   aspirin 81 mg Oral Daily   atorvastatin 80 mg Oral Nightly   b complex-vitamin c-folic acid 1 tablet Oral Daily   carvedilol 6.25 mg Oral BID   clopidogrel 75 mg Oral Daily   heparin (porcine) 5,000 Units Subcutaneous Q8H   thiamine 100 mg Oral Daily       Assessment/Plan     Problem List  Hospital Problem List     * (Principal)Metabolic encephalopathy    Hyperlipidemia    HTN (hypertension)    CAD (coronary artery disease)    Brain aneurysm    TY (acute kidney injury)    Leukocytosis    Hypokalemia    Pyuria               Assessment/Plan:    Encephalopathy  - possibly secondary to 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  - check markers of inflammation and vasculitis as patient has both dilation of coronary and vertebrobasilar arteries; however, discussed with Neurointervention Dr Henriquez, and after conferring with Cardiology this was felt to be an incidental finding. See Dr Henriquez's last note from prior hospitalization for further  details.   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  As patient no longer has housing here in [place], suspect the safest plan would be to discharge patient directly into the care of family or with his fiance. Would prefer if initial Neurologic follow up could be here with a local Neurologist in 4-6 weeks, then arrangements could be made for follow up subsequently with an out of town (or out of state) provider.    Live Frazier MD   06/03/17   4:27 PM         Electronically signed by Live Frazier MD at 6/3/2017  4:39 PM      Live Frazier MD at 6/4/2017  7:12 PM  Version 1 of 1             [facility] Medicine Services  INPATIENT PROGRESS NOTE    Date of Admission: 5/29/2017  Length of Stay: 0  Primary Care Physician: No Known Provider    Subjective   CC: Encephalopathy  HPI:    Denies pain or weakness. Eating well.      Review Of Systems:   Review of Systems   Constitutional: Negative.    HENT: Negative.    Eyes: Negative.    Respiratory: Negative.    Cardiovascular: Negative.    Gastrointestinal: Negative.    Endocrine: Negative.    Genitourinary: Negative.    Musculoskeletal: Negative.    Allergic/Immunologic: Negative.    Neurological: Positive for confusion.   Hematological: Negative.    Psychiatric/Behavioral: Negative for agitation.         Objective      Temp:  [97.9 °F (36.6 °C)-98.8 °F (37.1 °C)] 98.7 °F (37.1 °C)  Heart Rate:  [68-94] 70  Resp:  [16] 16  BP: (135-155)/() 145/93  [unfilled]  No acute distress, sitting up on edge of bed. Non  toxic  Speech clear, but mild confusion continues   strong bilaterally  RRR, no murmur rub or gallop  CTAB  Abdomen soft, non tender, non distended, normal bowel sounds  No lower extremity cyanosis, clubbing or edema  Normal affect  Moves all extremities with ease    Results Review:    I have reviewed the labs, radiology results and diagnostic studies.      Results from last 7 days  Lab Units 06/01/17  0832   WBC 10*3/mm3 8.33   HEMOGLOBIN g/dL 15.8   PLATELETS 10*3/mm3 196       Results from last 7 days  Lab Units 06/04/17  0804   SODIUM mmol/L 136   POTASSIUM mmol/L 3.9   CHLORIDE mmol/L 103   TOTAL CO2 mmol/L 27.0   BUN mg/dL 12   CREATININE mg/dL 0.70   GLUCOSE mg/dL 103*   CALCIUM mg/dL 9.5       Culture Data: Cultures:    Blood Culture   Date Value Ref Range Status   05/24/2017 No growth at 5 days  Final   05/24/2017 No growth at 5 days  Final     Urine Culture   Date Value Ref Range Status   05/29/2017 No growth  Preliminary       Radiology Data:     I have reviewed the medications.    amLODIPine 10 mg Oral Daily   aspirin 81 mg Oral Daily   atorvastatin 80 mg Oral Nightly   b complex-vitamin c-folic acid 1 tablet Oral Daily   carvedilol 6.25 mg Oral BID   clopidogrel 75 mg Oral Daily   heparin (porcine) 5,000 Units Subcutaneous Q8H   thiamine 100 mg Oral Daily       Assessment/Plan     Problem List  Hospital Problem List     * (Principal)Metabolic encephalopathy    Hyperlipidemia    HTN (hypertension)    CAD (coronary artery disease)    Brain aneurysm    TY (acute kidney injury)    Leukocytosis    Hypokalemia    Pyuria               Assessment/Plan:    Encephalopathy  - possibly secondary to 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  -  check markers of inflammation and vasculitis as patient has both dilation of coronary and vertebrobasilar arteries; however, discussed with Neurointervention Dr Henriquez, and after conferring with Cardiology this was felt to be an incidental finding. See Dr Henriquez's last note from prior hospitalization for further details.   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  As patient no longer has housing here in [place], suspect the safest plan would be to discharge patient directly into the care of family or with his fiance. Would prefer if initial Neurologic follow up could be here with a local Neurologist in 4-6 weeks, then arrangements could be made for follow up subsequently with an out of town (or out of state) provider.    Today, with patient's permission, I called patient's fihamida Oconnorberly (in [place]) at [phone]. She agrees that Mr Gamez needs to be in a monitored setting for the near and forseeable future. Mellissa says that she and her son work alternating shifts and would be able to provide near 24 hr monitoring and assistance for Mr Gamez. Mellissa plans to drive here Saturday and return to [place] with the patient on Sunday. She does request help with payment for the hotel stay (voucher).  Will ask case management to contact her regarding further arrangements.     I also suggested to the fiance that initial neurological followup would initially be best with a Jewish provider in order to review outstanding labs (including for Creutzfeld Andry disease, which remains pending), and  Mellissa is in agreement to bring the patient back in 4-6 weeks for the initial follow up visit. Thereafter care can be transferred to a provider in the [place] area. Case management to see if a voucher can be provided to help cover hotel expenses for this trip as well.    Patient in agreement with the above plan.    Total time spent face to face with the patient was 45 mintues.  25 minutes were spent counseling the patient and fiance on possible diagnoses (ischemic stroke vs prion disease) and the discharge plan as outlined above.    Live Frazier MD   06/04/17   7:12 PM         Electronically signed by Live Frazier MD at 6/4/2017  7:26 PM      MECHE Hernández at 6/5/2017  2:09 PM  Version 1 of 1             [facility] Medicine Services  INPATIENT PROGRESS NOTE    Date of Admission: 5/29/2017  Length of Stay: 0  Primary Care Physician: No Known Provider    Subjective   CC: f/u Encephalopathy      HPI:  She is seen resting upright on side of bed eating lunch.  He is in no acute distress.  No visitors at bedside.  He is awake, alert and pleasant.  Currently he is oriented ×4.  States he has episodes of forgetfulness.  Feels he is doing much better.  Tolerating diet.  Denies nausea, vomiting, chest pain, shortness of air.  States that his plan is to go back to [place] with his fiancée is coming weekend where he will live with her.  No new issues.      Review of Systems  Constitutional: Negative.   HENT: Negative.   Respiratory: Negative.   Cardiovascular: Negative.   Gastrointestinal: Negative.   Genitourinary: Negative.   Musculoskeletal: Negative.   Neurological: Positive for mild confusion.   Hematological: Negative.   Psychiatric/Behavioral: Negative for agitation.        Objective      Temp:  [97.8 °F (36.6 °C)-98.7 °F (37.1 °C)] 98.2 °F (36.8 °C)  Heart Rate:  [70-87] 79  Resp:  [16] 16  BP: (130-145)/(93-97) 131/94     Physical Exam  No acute distress, sitting up on  edge of bed.  No visitors at bedside  Speech clear, but mild confusion intermittently noted to some details of his events here.  Otherwise is currently alert and oriented seemingly ×4.  Obeys all commands.   strong bilaterally  RRR, no murmur rub or gallop  CTAB A/P. No adventitious sounds   Abdomen soft, non tender, non distended, normal bowel sounds  No lower extremity cyanosis, clubbing or edema  Normal affect, calm and cooperative  Moves all extremities with ease    Results Review:    I have reviewed the labs, radiology results and diagnostic studies.      Results from last 7 days  Lab Units 06/01/17  0832   WBC 10*3/mm3 8.33   HEMOGLOBIN g/dL 15.8   PLATELETS 10*3/mm3 196       Results from last 7 days  Lab Units 06/04/17  0804   SODIUM mmol/L 136   POTASSIUM mmol/L 3.9   CHLORIDE mmol/L 103   TOTAL CO2 mmol/L 27.0   BUN mg/dL 12   CREATININE mg/dL 0.70   GLUCOSE mg/dL 103*   CALCIUM mg/dL 9.5       Culture Data: Cultures:    Urine Culture   Date Value Ref Range Status   05/29/2017 No growth at 2 days  Final       Radiology Data:   Imaging Results (last 24 hours)     ** No results found for the last 24 hours. **            I have reviewed the medications.  Scheduled Meds:  amLODIPine 10 mg Oral Daily   aspirin 81 mg Oral Daily   atorvastatin 80 mg Oral Nightly   b complex-vitamin c-folic acid 1 tablet Oral Daily   carvedilol 6.25 mg Oral BID   clopidogrel 75 mg Oral Daily   heparin (porcine) 5,000 Units Subcutaneous Q8H   thiamine 100 mg Oral Daily     Continuous Infusions:  sodium chloride 100 mL/hr Last Rate: 100 mL/hr (05/30/17 1226)     PRN Meds:.•  acetaminophen  •  HYDROcodone-acetaminophen  •  hydrOXYzine  •  ondansetron **OR** ondansetron  •  potassium chloride **OR** potassium chloride **OR** potassium chloride      Assessment/Plan     Problem List  Hospital Problem List     * (Principal)Metabolic encephalopathy    Hyperlipidemia    HTN (hypertension)    CAD (coronary artery disease)    Brain  aneurysm    TY (acute kidney injury)    Leukocytosis    Hypokalemia    Pyuria           Assessment/Plan:     Encephalopathy  - possibly secondary to 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  - check markers of inflammation and vasculitis as patient has both dilation of coronary and vertebrobasilar arteries; however, discussed with Neurointervention Dr Henriquez, and after conferring with Cardiology this was felt to be an incidental finding. See Dr Henriquez's last note from prior hospitalization for further details.   - will need robust follow up plan with outpatient Neurology  - Repeat MRI Brain shows expected changes from what appears radiographically to have been a posterior circulation infarct  - asa, plavix and statin     UTI  -s/p 5 days abx - Urine culture no growth     HTN  - allow some permissive HTN     Dyslipidemia     CAD  - on asa and plavix     DVT prophylaxis: heparin     Discharge Planning: I expect patient to be discharged with family in 1-3 Days. As patient no longer has housing here in [place], suspect the safest plan would be to discharge patient directly into the care of family or with his fiance. Would prefer if initial Neurologic follow up could be here with a local Neurologist in 4-6 weeks, then arrangements could be made for follow up subsequently with an out of town (or out of state) provider.     Yesterday, with patient's permission, MD called patient's fihamida Mellissa (in [place]) at [phone]. She agrees that Mr Gamez needs to be in a monitored setting for the near and forseeable future. Mellissa says that she and her son work alternating shifts and would be able to provide near 24 hr monitoring and assistance for   Franco. Mellissa plans to drive here Saturday and return to [place] with the patient on Sunday. She does request help with payment for the hotel stay (voucher).  CM working on assistance if possible.    MD also suggested to the emmie that initial neurological followup would initially be best with a [facility] [place] provider in order to review outstanding labs (including for Creutzfeld Andry disease, which remains pending), and Mellissa is in agreement to bring the patient back in 4-6 weeks for the initial follow up visit. Thereafter care can be transferred to a provider in the [place] area. Case management to see if a voucher can be provided to help cover hotel expenses for this trip as well.     Patient in agreement with the above plan.    Today the  spoke with patient's Anastasia nava in [place].  She confirmed that she does plan to come to [place] to get the patient this coming Saturday.  She plans to stay overnight in the hospital room and transferred for patient back to her home in [place] Sunday.  Case management has agreed to assist with some gas money as per their note, however no other available resources at this time.  As patient has no other discharge ability, he will wait his girlfriend to arrive Saturday for transfer home as noted.     MECHE Hernández   06/05/17   2:09 PM          Electronically signed by MECHE Hernández at 6/5/2017  2:17 PM      MECHE Hernández at 6/6/2017 10:26 AM  Version 1 of 1             [facility]  INPATIENT PROGRESS NOTE    Date of Admission: 5/29/2017  Length of Stay: 0  Primary Care Physician: No Known Provider    Subjective   CC: f/u Encephalopathy    HPI:  Seen resting upright in bed in no acute distress.  No family at bedside.  Reports that he is feeling good today.  Denies any nausea, vomiting, chest pain, shortness of air.  No edema fever or chills.  Feels that his confusion has  significantly improved.  Tolerating diet.  States he is just awaiting his fiancée to come get him.  No new issues.      Review of Systems  Constitutional: Negative.   HENT: Negative.   Respiratory: Negative.   Cardiovascular: Negative.   Gastrointestinal: Negative.   Genitourinary: Negative.   Musculoskeletal: Negative.   Neurological: Positive for mild confusion (improved).   Hematological: Negative.   Psychiatric/Behavioral: Negative for agitation    Objective      Temp:  [98.2 °F (36.8 °C)-99.2 °F (37.3 °C)] 98.7 °F (37.1 °C)  Heart Rate:  [73-81] 80  Resp:  [16-18] 16  BP: (134-149)/() 142/92     Physical Exam  No acute distress, sitting up in bed.  No visitors at bedside  Speech clear, no clearly obvious confusion noted currently. Otherwise is currently alert and oriented seemingly ×4. Obeys all commands.   and pushes equal and strong bilaterally  RRR, no murmur rub or gallop  CTAB A/P. No adventitious sounds   Abdomen soft, non tender, non distended, normal bowel sounds  No lower extremity cyanosis, clubbing or edema  Normal affect, Pleasant, calm and cooperative  Moves all extremities with ease       Results Review:    I have reviewed the labs, radiology results and diagnostic studies.      Results from last 7 days  Lab Units 06/01/17  0832   WBC 10*3/mm3 8.33   HEMOGLOBIN g/dL 15.8   PLATELETS 10*3/mm3 196       Results from last 7 days  Lab Units 06/04/17  0804   SODIUM mmol/L 136   POTASSIUM mmol/L 3.9   CHLORIDE mmol/L 103   TOTAL CO2 mmol/L 27.0   BUN mg/dL 12   CREATININE mg/dL 0.70   GLUCOSE mg/dL 103*   CALCIUM mg/dL 9.5         Radiology Data:   Imaging Results (last 24 hours)     ** No results found for the last 24 hours. **            I have reviewed the medications.  Scheduled Meds:  amLODIPine 10 mg Oral Daily   aspirin 81 mg Oral Daily   atorvastatin 80 mg Oral Nightly   b complex-vitamin c-folic acid 1 tablet Oral Daily   carvedilol 6.25 mg Oral BID   clopidogrel 75 mg Oral Daily    heparin (porcine) 5,000 Units Subcutaneous Q8H   thiamine 100 mg Oral Daily     Continuous Infusions:  sodium chloride 100 mL/hr Last Rate: 100 mL/hr (05/30/17 7502)     PRN Meds:.•  acetaminophen  •  hydrALAZINE  •  HYDROcodone-acetaminophen  •  hydrOXYzine  •  ondansetron **OR** ondansetron  •  potassium chloride **OR** potassium chloride **OR** potassium chloride      Assessment/Plan     Problem List  Hospital Problem List     * (Principal)Metabolic encephalopathy    Hyperlipidemia    HTN (hypertension)    CAD (coronary artery disease)    Brain aneurysm    TY (acute kidney injury)    Leukocytosis    Hypokalemia    Pyuria        Assessment/Plan:      Encephalopathy  - possibly secondary to 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  - check markers of inflammation and vasculitis as patient has both dilation of coronary and vertebrobasilar arteries; however, discussed with Neurointervention Dr Henriquez, and after conferring with Cardiology this was felt to be an incidental finding. See Dr Henriquez's last note from prior hospitalization for further details.   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 As patient no longer has housing here in [place], suspect the safest plan would  be to discharge patient directly into the care of family or with his fiance. Would prefer if initial Neurologic follow up could be here with a local Neurologist in 4-6 weeks, then arrangements could be made for follow up subsequently with an out of town (or out of state) provider.      As per chart review, with patient's permission, MD called patient's emmie Malloy (in [place]) at [phone]. She agrees that Mr Gamez needs to be in a monitored setting for the near and forseeable future. Mellissa says that she and her son work alternating shifts and would be able to provide near 24 hr monitoring and assistance for Mr Gamez. Mellissa plans to drive here Saturday and return to [place] with the patient on Sunday. She does request help with payment for the hotel stay (voucher). CM working on assistance if possible.    MD also suggested to the fiance that initial neurological followup would initially be best with a [facility] provider in order to review outstanding labs (including for Creutzfeld Andry disease, which remains pending), and Mellissa is in agreement to bring the patient back in 4-6 weeks for the initial follow up visit. Thereafter care can be transferred to a provider in the [place] area. Case management to see if a voucher can be provided to help cover hotel expenses for this trip as well.      Patient in agreement with the above plan.     Yesterday 6/5 the  spoke with patient's Anastasia nava in [place]. She confirmed that she does plan to come to [place] to get the patient this coming Saturday. She plans to stay overnight in the hospital room and transferred for patient back to her home in [place] Sunday. Case management has agreed to assist with some gas money as per their note, however no other available resources at this time. As patient has no other discharge ability, he will wait his girlfriend to arrive Saturday for transfer home as noted.     Continue to monitor.  No new changes  today.  Plan is still to discharge this coming weekend with his fiancé as above.  No new labs for a.m.       MECHE Hernández   06/06/17   10:26 AM         Electronically signed by MECHE Hernández at 6/6/2017 10:39 AM           Consult Notes (last 7 days) (Notes from 05/30/17 through 06/06/17)      Ariana Coon MD at 5/30/2017  1:02 PM  Version 1 of 1     Consult Orders:    1. Inpatient Consult to Neurology [[id number]] ordered by MECHE Gay at 05/29/17 1951                Neurology    Referring Provider: MECHE Tafoya    Reason for Consultation: encephalopathy      Chief complaint: AMS    Subjective .     History of present illness:  Mr. Gamez is a pleasant 62-year-old male with a past medical history significant for CAD, hypertension, hyperlipidemia who is admitted to the hospitalist service for altered mental status. He was recently discharged from [facility] on 05/28/2017 for the same complaint. For full details of the history, please refer to the previous neurology consult note dated 05/26/2017. At that time, he had an MRI brain done which was remarkable for a gyral pattern of restricted diffusion involving the bilateral temporal and parietal regions. Little to no white matter was involved. One etiology for this presentation was thought to be hypoperfusion, as he had fusiform dilatation of the basilar artery noted on MRA. He was not noted to be hypotensive however. Another concern given the acute confusion and MRI pattern was prion disease, specifically CJD. Routine EEG was done which was unremarkable. Lumbar puncture was also done with preliminary CSF results unremarkable.  No evidence of pleocytosis. 14-3-3 protein study was sent off, which is currently pending.    Review of Systems: Positive for confusion. Otherwise complete review of systems was discussed with the patient and found to be negative except for that mentioned in history of present illness or in  the initial H&P dated 05/29/2017    History  Past Medical History:   Diagnosis Date   • Abnormal electrocardiogram 6/23/2016   • Arthralgia of hip 6/23/2016   • CAD (coronary artery disease)    • Hyperlipidemia 6/23/2016   • Hypertension    • Insomnia 6/23/2016   • Kawasaki's disease    • Neck pain    • Orchitis and epididymitis    • Proctitis 6/23/2016   • Rheumatic fever    • Right foot pain    • Sleep apnea 6/23/2016   • Stroke    • Tachycardia 6/23/2016   • Vitamin D deficiency 6/23/2016   ,   Past Surgical History:   Procedure Laterality Date   • CARDIAC CATHETERIZATION N/A 9/28/2016    Procedure: Left Heart Cath;  Surgeon: Paolo Moreno MD;  Location:  [facility];  Service:    • CEREBRAL ANGIOGRAM Bilateral 5/26/2017    Procedure: Cerebral angiogram;  Surgeon: Jude Henriquez MD;  Location:  [facility];  Service:    • COLONOSCOPY     • HAND SURGERY     • KNEE SURGERY     • NASAL SEPTUM SURGERY     ,   Family History   Problem Relation Age of Onset   • No Known Problems Mother    • Heart attack Father    • Coronary artery disease Other    • Stroke Other    • No Known Problems Sister    • No Known Problems Brother    • No Known Problems Daughter    • No Known Problems Son    ,   Social History   Substance Use Topics   • Smoking status: Never Smoker   • Smokeless tobacco: Never Used   • Alcohol use Yes      Comment: Seldom   ,   Prescriptions Prior to Admission   Medication Sig Dispense Refill Last Dose   • amLODIPine (NORVASC) 10 MG tablet Take 10 mg by mouth Daily.      • atorvastatin (LIPITOR) 80 MG tablet Take 80 mg by mouth Every Night.      • carvedilol (COREG) 6.25 MG tablet Take 1 tablet by mouth 2 (Two) Times a Day. 60 tablet 11    • clopidogrel (PLAVIX) 75 MG tablet Take 75 mg by mouth Daily.      • furosemide (LASIX) 20 MG tablet Take 20 mg by mouth Daily.      • hydrOXYzine (ATARAX) 25 MG tablet Take 1 tablet by mouth Every 6 (Six) Hours As Needed for Itching. 60  "tablet 0 Not Taking   • lisinopril (PRINIVIL,ZESTRIL) 40 MG tablet Take 1 tablet by mouth Daily. 30 tablet 11    • nitroglycerin (NITROSTAT) 0.4 MG SL tablet Place 1 tablet under the tongue As Needed for Chest Pain. 25 tablet 11    • RA ASPIRIN EC 81 MG EC tablet take 1 tablet by mouth once daily 30 tablet 5 Taking    and Allergies:  No known drug allergy    Objective     Vital Signs   Blood pressure 145/97, pulse 93, temperature 98.1 °F (36.7 °C), temperature source Tympanic, resp. rate 16, height 67\" (170.2 cm), weight 173 lb (78.5 kg), SpO2 96 %.    Physical Exam:      Gen: Lying in bed with eyes open. In NAD. Appears stated age   Eyes: PERRL, conjuntivae/lids normal   ENT: External canals normal bilaterally. Dentition normal   Neck: Supple. No thyroid enlargement noted   Respiratory: CTA bilaterally. Respirations unlabored   CV: RRR, S1 and S2 nml. Radial pulses 2+ bilaterally.    Skin: No rashes noted on exposed skin. Normal tugor.   MSK: Normal bulk and tone. Nml ROM     Neurologic:   Mental status: Awake, alert, oriented x3. Follows commands. Similar to his previous admission, his speech appears fluent, but the flow of conversation is disorganized and seems disjointed.   CN: PERRL, EOM intact, sensation intact in upper/mid/lower face bilaterally, facial movements symmetric, hearing intact to finger rub bilaterally, palate elevates symmetrically, tongue movements and SCMs strong bilaterally    Motor: Strength full (5/5) throughout in BUE and BLE    Reflexes: 2+ throughout.    Sensory: Intact to LT throughout   Coordination: No dysmetria noted   Gait: Not tested        Results Reviewed:     Labs reviewed  Previous MRI brain personally reviewed. Agree with report                 Assessment/Plan     1. Encephalopathy = Concerning for vascular causes/hypoperfusion vs infection, most notably CJD. The pattern of restricted diffusion seen on MRI is peculiar. His routine EEG from previous admission was unremarkable; " too early to repeat currently. LP obtained at last stay also. 14-3-3 protein pending. Patient already has follow-up appointment in neurology clinic to review results. Recommend repeat MRI brain in 3 months (approximately August 2017). For now agree with discussing dispo with case management.       Neuro will follow as needed        Ariana Coon MD  05/30/17  1:02 PM               Electronically signed by Ariana Coon MD at 5/30/2017  1:13 PM

## 2017-06-07 PROCEDURE — 25010000002 HEPARIN (PORCINE) PER 1000 UNITS: Performed by: NURSE PRACTITIONER

## 2017-06-07 PROCEDURE — 96372 THER/PROPH/DIAG INJ SC/IM: CPT

## 2017-06-07 PROCEDURE — G0378 HOSPITAL OBSERVATION PER HR: HCPCS

## 2017-06-07 PROCEDURE — 99225 PR SBSQ OBSERVATION CARE/DAY 25 MINUTES: CPT | Performed by: NURSE PRACTITIONER

## 2017-06-07 RX ADMIN — CARVEDILOL 6.25 MG: 6.25 TABLET, FILM COATED ORAL at 17:19

## 2017-06-07 RX ADMIN — HYDROCODONE BITARTATE AND ACETAMINOPHEN 1 TABLET: 5; 325 TABLET ORAL at 17:19

## 2017-06-07 RX ADMIN — CLOPIDOGREL BISULFATE 75 MG: 75 TABLET ORAL at 08:08

## 2017-06-07 RX ADMIN — HYDROCODONE BITARTATE AND ACETAMINOPHEN 1 TABLET: 5; 325 TABLET ORAL at 00:18

## 2017-06-07 RX ADMIN — HEPARIN SODIUM 5000 UNITS: 5000 INJECTION, SOLUTION INTRAVENOUS; SUBCUTANEOUS at 21:00

## 2017-06-07 RX ADMIN — AMLODIPINE BESYLATE 10 MG: 10 TABLET ORAL at 08:08

## 2017-06-07 RX ADMIN — HEPARIN SODIUM 5000 UNITS: 5000 INJECTION, SOLUTION INTRAVENOUS; SUBCUTANEOUS at 05:15

## 2017-06-07 RX ADMIN — CARVEDILOL 6.25 MG: 6.25 TABLET, FILM COATED ORAL at 08:08

## 2017-06-07 RX ADMIN — ATORVASTATIN CALCIUM 80 MG: 40 TABLET, FILM COATED ORAL at 21:00

## 2017-06-07 RX ADMIN — Medication 1 TABLET: at 08:08

## 2017-06-07 RX ADMIN — Medication 100 MG: at 08:08

## 2017-06-07 RX ADMIN — ASPIRIN 81 MG: 81 TABLET, COATED ORAL at 08:08

## 2017-06-07 RX ADMIN — HEPARIN SODIUM 5000 UNITS: 5000 INJECTION, SOLUTION INTRAVENOUS; SUBCUTANEOUS at 13:15

## 2017-06-07 NOTE — PLAN OF CARE
Problem: Confusion, Acute (Adult)  Goal: Identify Related Risk Factors and Signs and Symptoms  Outcome: Ongoing (interventions implemented as appropriate)  Goal: Cognitive/Functional Impairments Minimized  Outcome: Ongoing (interventions implemented as appropriate)  Goal: Safety  Outcome: Ongoing (interventions implemented as appropriate)    Problem: Pain, Acute (Adult)  Goal: Identify Related Risk Factors and Signs and Symptoms  Outcome: Ongoing (interventions implemented as appropriate)  Goal: Acceptable Pain Control/Comfort Level  Outcome: Ongoing (interventions implemented as appropriate)

## 2017-06-07 NOTE — PROGRESS NOTES
"    [facility] Medicine Services  INPATIENT PROGRESS NOTE    Date of Admission: 5/29/2017  Length of Stay: 0  Primary Care Physician: No Known Provider    Subjective   CC: f/u Encephalopathy    HPI:  Patient is seen resting upright on side of bed in no acute distress.  No visitors at bedside.  He reports that he feels \"okay\".  Denies chest pain, shortness of breath, nausea, vomiting, dizziness, or headache currently.  States he's had no current issues except just tired of being in the hospital.  Reports he is still awaiting his fiancé to come this weekend to take him home.      Review of Systems  Constitutional: Negative.   HENT: Negative.   Respiratory: Negative.   Cardiovascular: Negative.   Gastrointestinal: Negative.   Genitourinary: Negative.   Musculoskeletal: Negative.   Neurological: Positive for mild confusion (improved).   Hematological: Negative.   Psychiatric/Behavioral: Negative for agitation    Objective      Temp:  [98.2 °F (36.8 °C)-98.8 °F (37.1 °C)] 98.2 °F (36.8 °C)  Heart Rate:  [73-84] 82  Resp:  [16-20] 20  BP: (115-142)/() 132/98     Physical Exam  No acute distress, sitting upright on side of bed. No visitors at bedside  Speech clear, no clearly obvious confusion noted currently. Otherwise is currently alert and oriented seemingly ×4. Obeys all commands.   and pushes equal and strong bilaterally  RRR, no murmur rub or gallop  CTAB A/P. No adventitious sounds.  Breath sounds are even and nonlabored  Abdomen soft, non tender, non distended, normal bowel sounds  No lower extremity cyanosis, clubbing or edema  Normal affect, Pleasant, calm and cooperative  Moves all extremities with ease       Results Review:    I have reviewed the labs, radiology results and diagnostic studies.      Results from last 7 days  Lab Units 06/01/17  0832   WBC 10*3/mm3 8.33   HEMOGLOBIN g/dL 15.8   PLATELETS 10*3/mm3 196       Results from last 7 days  Lab Units 06/04/17  0804 "   SODIUM mmol/L 136   POTASSIUM mmol/L 3.9   CHLORIDE mmol/L 103   TOTAL CO2 mmol/L 27.0   BUN mg/dL 12   CREATININE mg/dL 0.70   GLUCOSE mg/dL 103*   CALCIUM mg/dL 9.5         Radiology Data:   Imaging Results (last 24 hours)     ** No results found for the last 24 hours. **          I have reviewed the medications.    Assessment/Plan     Problem List  Hospital Problem List     * (Principal)Metabolic encephalopathy    Hyperlipidemia    HTN (hypertension)    CAD (coronary artery disease)    Brain aneurysm    TY (acute kidney injury)    Leukocytosis    Hypokalemia    Pyuria              Assessment/Plan:      Encephalopathy  - possibly secondary to 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  - check markers of inflammation and vasculitis as patient has both dilation of coronary and vertebrobasilar arteries; however, discussed with Neurointervention Dr Henriquez, and after conferring with Cardiology this was felt to be an incidental finding. See Dr Henriquez's last note from prior hospitalization for further details.   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 As patient no longer has housing here in [place], suspect the safest plan would be to discharge patient directly into the care  of family or with his fiance. Would prefer if initial Neurologic follow up could be here with a local Neurologist in 4-6 weeks, then arrangements could be made for follow up subsequently with an out of town (or out of state) provider.      As per chart review, with patient's permission, MD called patient's emmie Malloy (in [place]) at [phone]. She agrees that Mr Gamez needs to be in a monitored setting for the near and forseeable future. Mellissa says that she and her son work alternating shifts and would be able to provide near 24 hr monitoring and assistance for Mr Gamez. Mellissa plans to drive here Saturday and return to [place] with the patient on Sunday. She does request help with payment for the hotel stay (voucher). CM working on assistance if possible.    MD also suggested to the fiance that initial neurological followup would initially be best with a [facility] provider in order to review outstanding labs (including for Creutzfeld Andry disease, which remains pending), and Mellissa is in agreement to bring the patient back in 4-6 weeks for the initial follow up visit. Thereafter care can be transferred to a provider in the [place] area. Case management to see if a voucher can be provided to help cover hotel expenses for this trip as well.      Patient in agreement with the above plan.      On 6/5 the  spoke with patient's Anastasia nava in [place]. She confirmed that she does plan to come to [place] to get the patient this coming Saturday. She plans to stay overnight in the hospital room and transferred for patient back to her home in [place] Sunday. Case management has agreed to assist with some gas money as per their note, however no other available resources at this time. As patient has no other discharge ability, he will wait his girlfriend to arrive Saturday for transfer home as noted.     6/7/17   Pt is currently stable appearing and tolerating meds without noted difficulty.   Tolerating diet.  Continue to monitor. No new changes again today. Plan is still to discharge this coming weekend with his fiancé as above.        Sapna Morrison, MECHE   06/07/17   1:04 PM

## 2017-06-08 PROCEDURE — 99225 PR SBSQ OBSERVATION CARE/DAY 25 MINUTES: CPT | Performed by: FAMILY MEDICINE

## 2017-06-08 PROCEDURE — 25010000002 HEPARIN (PORCINE) PER 1000 UNITS: Performed by: NURSE PRACTITIONER

## 2017-06-08 PROCEDURE — 96372 THER/PROPH/DIAG INJ SC/IM: CPT

## 2017-06-08 PROCEDURE — G0378 HOSPITAL OBSERVATION PER HR: HCPCS

## 2017-06-08 RX ADMIN — CARVEDILOL 6.25 MG: 6.25 TABLET, FILM COATED ORAL at 08:02

## 2017-06-08 RX ADMIN — AMLODIPINE BESYLATE 10 MG: 10 TABLET ORAL at 08:02

## 2017-06-08 RX ADMIN — ATORVASTATIN CALCIUM 80 MG: 40 TABLET, FILM COATED ORAL at 21:03

## 2017-06-08 RX ADMIN — Medication 100 MG: at 08:02

## 2017-06-08 RX ADMIN — HEPARIN SODIUM 5000 UNITS: 5000 INJECTION, SOLUTION INTRAVENOUS; SUBCUTANEOUS at 05:37

## 2017-06-08 RX ADMIN — HEPARIN SODIUM 5000 UNITS: 5000 INJECTION, SOLUTION INTRAVENOUS; SUBCUTANEOUS at 13:07

## 2017-06-08 RX ADMIN — CLOPIDOGREL BISULFATE 75 MG: 75 TABLET ORAL at 08:02

## 2017-06-08 RX ADMIN — ASPIRIN 81 MG: 81 TABLET, COATED ORAL at 08:02

## 2017-06-08 RX ADMIN — Medication 1 TABLET: at 08:02

## 2017-06-08 RX ADMIN — HYDROCODONE BITARTATE AND ACETAMINOPHEN 1 TABLET: 5; 325 TABLET ORAL at 02:12

## 2017-06-08 RX ADMIN — HEPARIN SODIUM 5000 UNITS: 5000 INJECTION, SOLUTION INTRAVENOUS; SUBCUTANEOUS at 21:03

## 2017-06-08 RX ADMIN — CARVEDILOL 6.25 MG: 6.25 TABLET, FILM COATED ORAL at 17:26

## 2017-06-08 RX ADMIN — HYDROCODONE BITARTATE AND ACETAMINOPHEN 1 TABLET: 5; 325 TABLET ORAL at 21:03

## 2017-06-08 NOTE — PLAN OF CARE
Problem: Patient Care Overview (Adult)  Goal: Plan of Care Review  Outcome: Ongoing (interventions implemented as appropriate)    Problem: Confusion, Acute (Adult)  Goal: Identify Related Risk Factors and Signs and Symptoms  Outcome: Ongoing (interventions implemented as appropriate)    Problem: Pain, Acute (Adult)  Goal: Identify Related Risk Factors and Signs and Symptoms  Outcome: Ongoing (interventions implemented as appropriate)    06/08/17 0239   Pain, Acute   Related Risk Factors (Acute Pain) persistent pain

## 2017-06-08 NOTE — PROGRESS NOTES
[facility] Medicine Services  INPATIENT PROGRESS NOTE    Date of Admission: 5/29/2017  Length of Stay: 0  Primary Care Physician: No Known Provider    Subjective   CC: f/u Encephalopathy    HPI:  Pt sitting up in bed, denies complaints, states his fiancee is coming to get him Saturday and they will go back to [place] on Sunday.  He denies complaints. No fever or chills. No n/v/d eating and drinking well.       Review of Systems  Constitutional: Negative.   HENT: Negative.   Respiratory: Negative.   Cardiovascular: Negative.   Gastrointestinal: Negative.   Genitourinary: Negative.   Musculoskeletal: Negative.   Neurological: Positive for mild confusion (improved).   Hematological: Negative.   Psychiatric/Behavioral: Negative for agitation    Objective      Temp:  [98.2 °F (36.8 °C)-98.4 °F (36.9 °C)] 98.4 °F (36.9 °C)  Heart Rate:  [83-93] 83  Resp:  [16-17] 17  BP: (121-125)/(76-91) 121/76     Physical Exam  No acute distress, sitting upright on side of bed. No family   Speech clear, no clearly obvious confusion noted currently. Otherwise is currently alert and oriented seemingly ×4. Obeys all commands.   and pushes equal and strong bilaterally  RRR, no murmur rub or gallop  CTAB A/P. No adventitious sounds.  Breath sounds are even and nonlabored  Abdomen soft, non tender, non distended, normal bowel sounds  No lower extremity cyanosis, clubbing or edema  Normal affect, Pleasant, calm and cooperative  Moves all extremities with ease       Results Review:    I have reviewed the labs, radiology results and diagnostic studies.          Results from last 7 days  Lab Units 06/04/17  0804   SODIUM mmol/L 136   POTASSIUM mmol/L 3.9   CHLORIDE mmol/L 103   TOTAL CO2 mmol/L 27.0   BUN mg/dL 12   CREATININE mg/dL 0.70   GLUCOSE mg/dL 103*   CALCIUM mg/dL 9.5         Radiology Data:   Imaging Results (last 24 hours)     ** No results found for the last 24 hours. **          I have reviewed the  medications.    Assessment/Plan     Problem List  Hospital Problem List     * (Principal)Metabolic encephalopathy    Hyperlipidemia    HTN (hypertension)    CAD (coronary artery disease)    Brain aneurysm    TY (acute kidney injury)    Leukocytosis    Hypokalemia    Pyuria              Assessment/Plan:      Encephalopathy  - possibly secondary to 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  - check markers of inflammation and vasculitis as patient has both dilation of coronary and vertebrobasilar arteries; however, discussed with Neurointervention Dr Henriquez, and after conferring with Cardiology this was felt to be an incidental finding. See Dr Henriquez's last note from prior hospitalization for further details.   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 As patient no longer has housing here in [place], suspect the safest plan would be to discharge patient directly into the care of family or with his fiance. Would prefer if initial Neurologic follow up could be here with a local Neurologist in 4-6 weeks, then arrangements could be made for follow up subsequently with an out of town (or out of state) provider.      As per chart review, with patient's permission, MD  called patient's emmie Malloy (in [place]) at [phone]. She agrees that Mr Gamez needs to be in a monitored setting for the near and forseeable future. Mellissa says that she and her son work alternating shifts and would be able to provide near 24 hr monitoring and assistance for Mr Gamez. Mellissa plans to drive here Saturday and return to [place] with the patient on Sunday. She does request help with payment for the hotel stay (voucher). CM working on assistance if possible.    MD also suggested to the fihamida that initial neurological followup would initially be best with a [facility] provider in order to review outstanding labs (including for Creutzfeld Andry disease, which remains pending), and Mellissa is in agreement to bring the patient back in 4-6 weeks for the initial follow up visit. Thereafter care can be transferred to a provider in the [place] area. Case management to see if a voucher can be provided to help cover hotel expenses for this trip as well.      Patient in agreement with the above plan.      On 6/5 the  spoke with patient's Anastasia nava in [place]. She confirmed that she does plan to come to [place] to get the patient this coming Saturday. She plans to stay overnight in the hospital room and transferred for patient back to her home in [place] Sunday. Case management has agreed to assist with some gas money as per their note, however no other available resources at this time. As patient has no other discharge ability, he will wait his girlfriend to arrive Saturday for transfer home as noted.     Pt is currently stable appearing and tolerating meds without noted difficulty.  Tolerating diet.  Continue to monitor. No new changes again today. Plan is still to discharge this coming weekend with his brandy as above.        Karen Briseno, DO   06/08/17   3:17 PM

## 2017-06-08 NOTE — PROGRESS NOTES
Continued Stay Note   [place]     Patient Name: Mack Gamez  MRN: [id number]  Today's Date: 6/8/2017    Admit Date: 5/29/2017          Discharge Plan       06/08/17 0930    Case Management/Social Work Plan    Plan Social work spoke with Mr. Gamez's girlfriend Mellissa in [place] at [phone] and she is planning to come to the hospital this coming Saturday 6/10/17 to take Mr. Gamez back to [place] on Sunday 6/11/17.  Social work can assist with gas of $20.00 and she agreed that would be helpful.  She will stay in his hospital room on Saturday.    Patient/Family In Agreement With Plan yes              Discharge Codes     None        Expected Discharge Date and Time     Expected Discharge Date Expected Discharge Time    Akhil 10, 2017             MALCOLM Ramon

## 2017-06-09 PROCEDURE — G0378 HOSPITAL OBSERVATION PER HR: HCPCS

## 2017-06-09 PROCEDURE — 96372 THER/PROPH/DIAG INJ SC/IM: CPT

## 2017-06-09 PROCEDURE — 99224 PR SBSQ OBSERVATION CARE/DAY 15 MINUTES: CPT | Performed by: FAMILY MEDICINE

## 2017-06-09 PROCEDURE — 25010000002 HEPARIN (PORCINE) PER 1000 UNITS: Performed by: NURSE PRACTITIONER

## 2017-06-09 RX ORDER — HYDROCODONE BITARTRATE AND ACETAMINOPHEN 5; 325 MG/1; MG/1
1 TABLET ORAL EVERY 6 HOURS PRN
Status: DISPENSED | OUTPATIENT
Start: 2017-06-09 | End: 2017-06-10

## 2017-06-09 RX ADMIN — HEPARIN SODIUM 5000 UNITS: 5000 INJECTION, SOLUTION INTRAVENOUS; SUBCUTANEOUS at 05:26

## 2017-06-09 RX ADMIN — HYDROCODONE BITARTRATE AND ACETAMINOPHEN 1 TABLET: 5; 325 TABLET ORAL at 04:56

## 2017-06-09 RX ADMIN — AMLODIPINE BESYLATE 10 MG: 10 TABLET ORAL at 08:26

## 2017-06-09 RX ADMIN — ASPIRIN 81 MG: 81 TABLET, COATED ORAL at 08:26

## 2017-06-09 RX ADMIN — CARVEDILOL 6.25 MG: 6.25 TABLET, FILM COATED ORAL at 17:09

## 2017-06-09 RX ADMIN — HEPARIN SODIUM 5000 UNITS: 5000 INJECTION, SOLUTION INTRAVENOUS; SUBCUTANEOUS at 21:51

## 2017-06-09 RX ADMIN — Medication 100 MG: at 08:26

## 2017-06-09 RX ADMIN — Medication 1 TABLET: at 08:26

## 2017-06-09 RX ADMIN — HYDROCODONE BITARTRATE AND ACETAMINOPHEN 1 TABLET: 5; 325 TABLET ORAL at 21:52

## 2017-06-09 RX ADMIN — HEPARIN SODIUM 5000 UNITS: 5000 INJECTION, SOLUTION INTRAVENOUS; SUBCUTANEOUS at 13:43

## 2017-06-09 RX ADMIN — CARVEDILOL 6.25 MG: 6.25 TABLET, FILM COATED ORAL at 08:26

## 2017-06-09 RX ADMIN — CLOPIDOGREL BISULFATE 75 MG: 75 TABLET ORAL at 08:26

## 2017-06-09 RX ADMIN — ATORVASTATIN CALCIUM 80 MG: 40 TABLET, FILM COATED ORAL at 21:53

## 2017-06-09 NOTE — PROGRESS NOTES
Continued Stay Note   [place]     Patient Name: Mack Gamez  MRN: [id number]  Today's Date: 6/9/2017    Admit Date: 5/29/2017          Discharge Plan       06/09/17 1225    Case Management/Social Work Plan    Plan Social work called and left a message with Mellissa and explained that the $20.00 is in an envelope in Mr. Gamez;s room and he is aware that she will be coming on Saturday to the hospital from [place] to transport him back to [place] Sunday.    Patient/Family In Agreement With Plan yes              Discharge Codes     None        Expected Discharge Date and Time     Expected Discharge Date Expected Discharge Time    Akhil 10, 2017             MALCOLM Ramon

## 2017-06-09 NOTE — SIGNIFICANT NOTE
RN called to report norco q6h prn expiring, and he consistently uses it. Reviewed chart. Reviewed [facility] (record up to prior to moving to [place]?) - used to be on MSO4 ER 60 mg BID with perc 10 TID in last weeks-months. Will extend norco PRN order x24 hours. Rounding provider to re-eval pain regiment needs.

## 2017-06-09 NOTE — PROGRESS NOTES
Continued Stay Note  [facility]     Patient Name: Mack Gamez  MRN: [id number]  Today's Date: 6/9/2017    Admit Date: 5/29/2017          Discharge Plan       06/09/17 1614    Case Management/Social Work Plan    Plan Social work spoke with Mr. Gamez and he can get his medications filled at [facility] on Sunday and the [facility] is closed Saturday and Sunday.     Patient/Family In Agreement With Plan yes              Discharge Codes     None        Expected Discharge Date and Time     Expected Discharge Date Expected Discharge Time    Akhil 10, 2017             MALCOLM Ramon

## 2017-06-09 NOTE — PROGRESS NOTES
[facility] Medicine Services  INPATIENT PROGRESS NOTE    Date of Admission: 5/29/2017  Length of Stay: 0  Primary Care Physician: No Known Provider    Subjective   CC: f/u Encephalopathy    HPI:  Pt resting comfortably, I did not awaken him, no events noted overnight.       Review of Systems  Constitutional: Negative.   HENT: Negative.   Respiratory: Negative.   Cardiovascular: Negative.   Gastrointestinal: Negative.   Genitourinary: Negative.   Musculoskeletal: Negative.   Neurological: Positive for mild confusion (improved).   Hematological: Negative.   Psychiatric/Behavioral: Negative for agitation    Objective      Temp:  [97.7 °F (36.5 °C)-98.2 °F (36.8 °C)] 97.7 °F (36.5 °C)  Heart Rate:  [74-80] 74  Resp:  [16] 16  BP: (126-162)/() 145/93     Physical Exam     RRR, no murmur rub or gallop  CTAB A/P.   Breath sounds are even and nonlabored  Abdomen soft,  non distended, normal bowel sounds  No lower extremity cyanosis, clubbing or edema       Results Review:    I have reviewed the labs, radiology results and diagnostic studies.          Results from last 7 days  Lab Units 06/04/17  0804   SODIUM mmol/L 136   POTASSIUM mmol/L 3.9   CHLORIDE mmol/L 103   TOTAL CO2 mmol/L 27.0   BUN mg/dL 12   CREATININE mg/dL 0.70   GLUCOSE mg/dL 103*   CALCIUM mg/dL 9.5         Radiology Data:   Imaging Results (last 24 hours)     ** No results found for the last 24 hours. **          I have reviewed the medications.    Assessment/Plan     Problem List  Hospital Problem List     * (Principal)Metabolic encephalopathy    Hyperlipidemia    HTN (hypertension)    CAD (coronary artery disease)    Brain aneurysm    TY (acute kidney injury)    Leukocytosis    Hypokalemia    Pyuria              Assessment/Plan:      Encephalopathy  - possibly secondary to low flow in the vertebrobasilar system secondary to basilar artery dolichoectasia -- continue asa and plavix, and allow for some permissive  hypertension (systolic goal 140's).  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  - check markers of inflammation and vasculitis as patient has both dilation of coronary and vertebrobasilar arteries; however, discussed with Neurointervention Dr Henriquez, and after conferring with Cardiology this was felt to be an incidental finding. See Dr Henriquez's last note from prior hospitalization for further details.   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 As patient no longer has housing here in [place], suspect the safest plan would be to discharge patient directly into the care of family or with his fiance. Would prefer if initial Neurologic follow up could be here with a local Neurologist in 4-6 weeks, then arrangements could be made for follow up subsequently with an out of town (or out of state) provider.      As per chart review, with patient's permission, MD called patient's fihamida Malloy (in [place]) at [phone]. She agrees that Mr Gamez needs to be in a monitored setting for the near and forseeable future. Mellissa says that she and her son work alternating shifts and would be able to provide near 24 hr monitoring and assistance for Mr Gamez. Mellissa plans to drive here Saturday and return to [place] with the patient on Sunday. She does request help with payment for the hotel stay (voucher). CM working on assistance if possible.     MD also suggested to the fiance that initial neurological followup would initially be best with a [facility] provider in order to review outstanding labs (including for Creutzfeld Andry disease, which remains pending), and Mellissa is in agreement to bring the patient back in 4-6 weeks for the initial follow up visit. Thereafter care can be transferred to a provider in the [place] area. Case management to see if a voucher can be provided to help cover hotel expenses for this trip as well.      Patient in agreement with the above plan.      On 6/5 the  spoke with patient's fiancéAnastasia in [place]. She confirmed that she does plan to come to [place] to get the patient this coming Saturday. She plans to stay overnight in the hospital room and transferred for patient back to her home in [place] Sunday. Case management has agreed to assist with some gas money as per their note, however no other available resources at this time. As patient has no other discharge ability, he will wait his girlfriend to arrive Saturday for transfer home as noted.     Pt is currently stable appearing and tolerating meds without noted difficulty.  Tolerating diet.  Continue to monitor. No new changes again today. Plan is still to discharge this coming weekend with his fiancé as above.        Karen Briseno, DO   06/09/17   4:28 PM

## 2017-06-09 NOTE — PROGRESS NOTES
Continued Stay Note  [facility]     Patient Name: Mack Gamez  MRN: [id number]  Today's Date: 6/9/2017    Admit Date: 5/29/2017          Discharge Plan       06/09/17 1238    Case Management/Social Work Plan    Plan Social work was able to speak directly to Mellissa in [place] at [phone] and explained that the $20.00 for gas assistance that she requested is in an envelope in Mr. Gamez's room and Mellissa verified that she would be coming to the hospital on Saturday 6/10/17 evening to transport Mr. Gamez back with her to [place] on Sunday 6/11/17.  Mr. Mcak Gamez is also aware and he is in agreement that she is coming on Saturday 6/10/17.  Mellissa said she would stay in the hospital room Satuday night visting  Mr. Gamez.    Patient/Family In Agreement With Plan yes      06/09/17 1225    Case Management/Social Work Plan    Plan Social work called and left a message with Mellissa and explained that the $20.00 is in an envelope in Mr. Gamez;s room and he is aware that she will be coming on Saturday to the hospital from [place] to transport him back to [place] Sunday.    Patient/Family In Agreement With Plan yes              Discharge Codes     None        Expected Discharge Date and Time     Expected Discharge Date Expected Discharge Time    Akhil 10, 2017             MALCOLM Ramon

## 2017-06-10 PROCEDURE — G0378 HOSPITAL OBSERVATION PER HR: HCPCS

## 2017-06-10 PROCEDURE — 25010000002 HEPARIN (PORCINE) PER 1000 UNITS: Performed by: NURSE PRACTITIONER

## 2017-06-10 PROCEDURE — 99225 PR SBSQ OBSERVATION CARE/DAY 25 MINUTES: CPT | Performed by: FAMILY MEDICINE

## 2017-06-10 PROCEDURE — 96372 THER/PROPH/DIAG INJ SC/IM: CPT

## 2017-06-10 RX ORDER — HYDROCODONE BITARTRATE AND ACETAMINOPHEN 5; 325 MG/1; MG/1
1 TABLET ORAL ONCE
Status: COMPLETED | OUTPATIENT
Start: 2017-06-10 | End: 2017-06-10

## 2017-06-10 RX ADMIN — AMLODIPINE BESYLATE 10 MG: 10 TABLET ORAL at 09:14

## 2017-06-10 RX ADMIN — Medication 1 TABLET: at 09:14

## 2017-06-10 RX ADMIN — CARVEDILOL 6.25 MG: 6.25 TABLET, FILM COATED ORAL at 09:14

## 2017-06-10 RX ADMIN — ASPIRIN 81 MG: 81 TABLET, COATED ORAL at 09:14

## 2017-06-10 RX ADMIN — HEPARIN SODIUM 5000 UNITS: 5000 INJECTION, SOLUTION INTRAVENOUS; SUBCUTANEOUS at 14:53

## 2017-06-10 RX ADMIN — HYDROCODONE BITARTRATE AND ACETAMINOPHEN 1 TABLET: 5; 325 TABLET ORAL at 23:16

## 2017-06-10 RX ADMIN — CLOPIDOGREL BISULFATE 75 MG: 75 TABLET ORAL at 09:14

## 2017-06-10 RX ADMIN — Medication 100 MG: at 09:19

## 2017-06-10 RX ADMIN — HEPARIN SODIUM 5000 UNITS: 5000 INJECTION, SOLUTION INTRAVENOUS; SUBCUTANEOUS at 04:04

## 2017-06-10 RX ADMIN — CARVEDILOL 6.25 MG: 6.25 TABLET, FILM COATED ORAL at 17:59

## 2017-06-10 RX ADMIN — HYDROXYZINE HYDROCHLORIDE 25 MG: 25 TABLET, FILM COATED ORAL at 23:16

## 2017-06-10 RX ADMIN — HEPARIN SODIUM 5000 UNITS: 5000 INJECTION, SOLUTION INTRAVENOUS; SUBCUTANEOUS at 22:29

## 2017-06-10 RX ADMIN — HYDROCODONE BITARTRATE AND ACETAMINOPHEN 1 TABLET: 5; 325 TABLET ORAL at 04:04

## 2017-06-10 RX ADMIN — ATORVASTATIN CALCIUM 80 MG: 40 TABLET, FILM COATED ORAL at 22:29

## 2017-06-10 NOTE — PLAN OF CARE
Problem: Patient Care Overview (Adult)  Goal: Plan of Care Review  Outcome: Ongoing (interventions implemented as appropriate)    06/10/17 0639   Coping/Psychosocial Response Interventions   Plan Of Care Reviewed With patient   Patient Care Overview   Progress improving   Outcome Evaluation   Outcome Summary/Follow up Plan Patient slept through night with a few interruptions due to complaijnts of pain in his neck radiating into shoulders and back. PRN pain meds well managed pain. Patient been oriented but states he can't remember what the doctors said to why the reason he was actually admitted. Vitals stable and cardiac monitor is sinus. Patient up ad bertha and steady on feet.         Problem: Confusion, Acute (Adult)  Goal: Identify Related Risk Factors and Signs and Symptoms  Outcome: Ongoing (interventions implemented as appropriate)    06/10/17 0639   Confusion, Acute   Related Risk Factors (Acute Confusion) sleep disturbance;pain   Signs and Symptoms (Acute Confusion) thought process diminished/disorganized       Goal: Cognitive/Functional Impairments Minimized  Outcome: Ongoing (interventions implemented as appropriate)    06/10/17 0639   Confusion, Acute (Adult)   Cognitive/Functional Impairments Minimized making progress toward outcome       Goal: Safety  Outcome: Ongoing (interventions implemented as appropriate)    06/10/17 0639   Confusion, Acute (Adult)   Safety making progress toward outcome         Problem: Pain, Acute (Adult)  Goal: Identify Related Risk Factors and Signs and Symptoms  Outcome: Ongoing (interventions implemented as appropriate)    06/10/17 0639   Pain, Acute   Related Risk Factors (Acute Pain) positioning   Signs and Symptoms (Acute Pain) guarding/abnormal posturing/positioning;pacing/restlessness;verbalization of pain descriptors       Goal: Acceptable Pain Control/Comfort Level  Outcome: Ongoing (interventions implemented as appropriate)    06/10/17 0639   Pain, Acute (Adult)    Acceptable Pain Control/Comfort Level making progress toward outcome         Problem: Fall Risk (Adult)  Goal: Identify Related Risk Factors and Signs and Symptoms  Outcome: Ongoing (interventions implemented as appropriate)    06/10/17 0639   Fall Risk   Fall Risk: Related Risk Factors age-related changes;sleep pattern alteration   Fall Risk: Signs and Symptoms presence of risk factors       Goal: Absence of Falls  Outcome: Ongoing (interventions implemented as appropriate)    06/10/17 0639   Fall Risk (Adult)   Absence of Falls making progress toward outcome

## 2017-06-10 NOTE — PROGRESS NOTES
[facility] Medicine Services  INPATIENT PROGRESS NOTE    Date of Admission: 5/29/2017  Length of Stay: 0  Primary Care Physician: No Known Provider    Subjective   CC: f/u Encephalopathy    HPI:  C/o neck discomfort from bed today. Denies other complaints. Yaya is supposed to be here tonight for home tomorrow       Review of Systems  Constitutional: Negative.   HENT: Negative.   Respiratory: Negative.   Cardiovascular: Negative.   Gastrointestinal: Negative.   Genitourinary: Negative.   Musculoskeletal: Negative.   Neurological: Positive for mild confusion (improved).   Hematological: Negative.   Psychiatric/Behavioral: Negative for agitation    Objective      Temp:  [97.5 °F (36.4 °C)-98 °F (36.7 °C)] 97.5 °F (36.4 °C)  Heart Rate:  [81-83] 81  Resp:  [16] 16  BP: (126-156)/() 126/84     Physical Exam   No acute distress, sitting up on side of bed. No family, on phone with fiance   Speech clear, no clearly obvious confusion noted currently. Otherwise is currently alert and oriented seemingly ×4. Obeys all commands.   and pushes equal and strong bilaterally  RRR, no murmur rub or gallop  CTAB A/P. No adventitious sounds. Breath sounds are even and nonlabored  Abdomen soft, non tender, non distended, normal bowel sounds  No lower extremity cyanosis, clubbing or edema  Normal affect, Pleasant, calm and cooperative  Moves all extremities with ease         Results Review:    I have reviewed the labs, radiology results and diagnostic studies.          Results from last 7 days  Lab Units 06/04/17  0804   SODIUM mmol/L 136   POTASSIUM mmol/L 3.9   CHLORIDE mmol/L 103   TOTAL CO2 mmol/L 27.0   BUN mg/dL 12   CREATININE mg/dL 0.70   GLUCOSE mg/dL 103*   CALCIUM mg/dL 9.5         Radiology Data:   Imaging Results (last 24 hours)     ** No results found for the last 24 hours. **          I have reviewed the medications.    Assessment/Plan     Problem List  Hospital Problem List     *  (Principal)Metabolic encephalopathy    Hyperlipidemia    HTN (hypertension)    CAD (coronary artery disease)    Brain aneurysm    TY (acute kidney injury)    Leukocytosis    Hypokalemia    Pyuria              Assessment/Plan:      Encephalopathy  - possibly secondary to 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  - check markers of inflammation and vasculitis as patient has both dilation of coronary and vertebrobasilar arteries; however, discussed with Neurointervention Dr Henriquez, and after conferring with Cardiology this was felt to be an incidental finding. See Dr Henriquez's last note from prior hospitalization for further details.   - will need robust follow up plan with outpatient Neurology  - Repeat MRI Brain shows expected changes from what appears radiographically to have been a posterior circulation infarct  - on asa, plavix and statin      UTI  -s/p 5 days abx -dcd  - Urine culture no growth      HTN  -allow some permissive HTN  -low dose prn BP med for SBP >170 or DBP >105      Dyslipidemia      CAD  - on asa and plavix    Neck pain   --kpad      DVT prophylaxis: heparin      Discharge Planning: I expect patient to be discharged with family on Saturday or Sunday as per . As patient no longer has housing here in [place], suspect the safest plan would be to discharge patient directly into the care of family or with his fiance. Would prefer if initial Neurologic follow up could be here with a local Neurologist in 4-6 weeks, then arrangements could be made for follow up subsequently with an out of town (or out of state) provider.      As per chart review, with patient's permission, MD called patient's fihamida Chapaly (in [place]) at  [phone]. She agrees that Mr Gamez needs to be in a monitored setting for the near and forseeable future. Mellissa says that she and her son work alternating shifts and would be able to provide near 24 hr monitoring and assistance for Mr Gamez. Mellissa plans to drive here Saturday and return to [place] with the patient on Sunday. She does request help with payment for the hotel stay (voucher). CM working on assistance if possible.    MD also suggested to the fihamida that initial neurological followup would initially be best with a Buddhist provider in order to review outstanding labs (including for Creutzfeld Andry disease, which remains pending), and Mellissa is in agreement to bring the patient back in 4-6 weeks for the initial follow up visit. Thereafter care can be transferred to a provider in the [place] area. Case management to see if a voucher can be provided to help cover hotel expenses for this trip as well.        On 6/5 the  spoke with patient's fiancéAnastasia in [place]. She confirmed that she does plan to come to [place] to get the patient this coming Saturday. She plans to stay overnight in the hospital room and transferred for patient back to her home in [place] Sunday. Case management has agreed to assist with some gas money as per their note, however no other available resources at this time. As patient has no other discharge ability, he will wait his girlfriend to arrive today for transfer home as noted. Plan to  RX for hima Briseno,    06/10/17   1:18 PM

## 2017-06-10 NOTE — PLAN OF CARE
Problem: Patient Care Overview (Adult)  Goal: Plan of Care Review  Outcome: Ongoing (interventions implemented as appropriate)  Goal: Adult Individualization and Mutuality  Outcome: Ongoing (interventions implemented as appropriate)  Goal: Discharge Needs Assessment  Outcome: Ongoing (interventions implemented as appropriate)    Problem: Confusion, Acute (Adult)  Goal: Identify Related Risk Factors and Signs and Symptoms  Outcome: Ongoing (interventions implemented as appropriate)  Goal: Cognitive/Functional Impairments Minimized  Outcome: Ongoing (interventions implemented as appropriate)  Goal: Safety  Outcome: Ongoing (interventions implemented as appropriate)    Problem: Pain, Acute (Adult)  Goal: Identify Related Risk Factors and Signs and Symptoms  Outcome: Ongoing (interventions implemented as appropriate)  Goal: Acceptable Pain Control/Comfort Level  Outcome: Ongoing (interventions implemented as appropriate)    Problem: Fall Risk (Adult)  Goal: Identify Related Risk Factors and Signs and Symptoms  Outcome: Ongoing (interventions implemented as appropriate)  Goal: Absence of Falls  Outcome: Ongoing (interventions implemented as appropriate)

## 2017-06-11 VITALS
BODY MASS INDEX: 27.15 KG/M2 | HEART RATE: 90 BPM | OXYGEN SATURATION: 98 % | RESPIRATION RATE: 18 BRPM | WEIGHT: 173 LBS | SYSTOLIC BLOOD PRESSURE: 137 MMHG | HEIGHT: 67 IN | DIASTOLIC BLOOD PRESSURE: 98 MMHG | TEMPERATURE: 98.9 F

## 2017-06-11 LAB
ANION GAP SERPL CALCULATED.3IONS-SCNC: 4 MMOL/L (ref 3–11)
BASOPHILS # BLD AUTO: 0.03 10*3/MM3 (ref 0–0.2)
BASOPHILS NFR BLD AUTO: 0.4 % (ref 0–1)
BUN BLD-MCNC: 19 MG/DL (ref 9–23)
BUN/CREAT SERPL: 21.1 (ref 7–25)
CALCIUM SPEC-SCNC: 9.5 MG/DL (ref 8.7–10.4)
CHLORIDE SERPL-SCNC: 104 MMOL/L (ref 99–109)
CO2 SERPL-SCNC: 29 MMOL/L (ref 20–31)
CREAT BLD-MCNC: 0.9 MG/DL (ref 0.6–1.3)
DEPRECATED RDW RBC AUTO: 45.5 FL (ref 37–54)
EOSINOPHIL # BLD AUTO: 0.12 10*3/MM3 (ref 0.1–0.3)
EOSINOPHIL NFR BLD AUTO: 1.7 % (ref 0–3)
ERYTHROCYTE [DISTWIDTH] IN BLOOD BY AUTOMATED COUNT: 14 % (ref 11.3–14.5)
GFR SERPL CREATININE-BSD FRML MDRD: 86 ML/MIN/1.73
GLUCOSE BLD-MCNC: 98 MG/DL (ref 70–100)
HCT VFR BLD AUTO: 43.5 % (ref 38.9–50.9)
HGB BLD-MCNC: 14.5 G/DL (ref 13.1–17.5)
IMM GRANULOCYTES # BLD: 0.02 10*3/MM3 (ref 0–0.03)
IMM GRANULOCYTES NFR BLD: 0.3 % (ref 0–0.6)
LYMPHOCYTES # BLD AUTO: 2.39 10*3/MM3 (ref 0.6–4.8)
LYMPHOCYTES NFR BLD AUTO: 33.3 % (ref 24–44)
MCH RBC QN AUTO: 29.9 PG (ref 27–31)
MCHC RBC AUTO-ENTMCNC: 33.3 G/DL (ref 32–36)
MCV RBC AUTO: 89.7 FL (ref 80–99)
MONOCYTES # BLD AUTO: 1.11 10*3/MM3 (ref 0–1)
MONOCYTES NFR BLD AUTO: 15.5 % (ref 0–12)
NEUTROPHILS # BLD AUTO: 3.5 10*3/MM3 (ref 1.5–8.3)
NEUTROPHILS NFR BLD AUTO: 48.8 % (ref 41–71)
PLATELET # BLD AUTO: 218 10*3/MM3 (ref 150–450)
PMV BLD AUTO: 9.7 FL (ref 6–12)
POTASSIUM BLD-SCNC: 4 MMOL/L (ref 3.5–5.5)
RBC # BLD AUTO: 4.85 10*6/MM3 (ref 4.2–5.76)
SODIUM BLD-SCNC: 137 MMOL/L (ref 132–146)
WBC NRBC COR # BLD: 7.17 10*3/MM3 (ref 3.5–10.8)

## 2017-06-11 PROCEDURE — 99217 PR OBSERVATION CARE DISCHARGE MANAGEMENT: CPT | Performed by: NURSE PRACTITIONER

## 2017-06-11 PROCEDURE — 85025 COMPLETE CBC W/AUTO DIFF WBC: CPT | Performed by: FAMILY MEDICINE

## 2017-06-11 PROCEDURE — G0378 HOSPITAL OBSERVATION PER HR: HCPCS

## 2017-06-11 PROCEDURE — 25010000002 HEPARIN (PORCINE) PER 1000 UNITS: Performed by: NURSE PRACTITIONER

## 2017-06-11 PROCEDURE — 80048 BASIC METABOLIC PNL TOTAL CA: CPT | Performed by: FAMILY MEDICINE

## 2017-06-11 PROCEDURE — 96372 THER/PROPH/DIAG INJ SC/IM: CPT

## 2017-06-11 RX ORDER — CLOPIDOGREL BISULFATE 75 MG/1
75 TABLET ORAL DAILY
Qty: 30 TABLET | Refills: 0 | Status: SHIPPED | OUTPATIENT
Start: 2017-06-11 | End: 2017-06-11

## 2017-06-11 RX ORDER — AMLODIPINE BESYLATE 10 MG/1
10 TABLET ORAL DAILY
Qty: 30 TABLET | Refills: 0 | Status: SHIPPED | OUTPATIENT
Start: 2017-06-11

## 2017-06-11 RX ORDER — ASPIRIN 81 MG/1
81 TABLET ORAL DAILY
Qty: 30 TABLET | Refills: 0 | Status: SHIPPED | OUTPATIENT
Start: 2017-06-11 | End: 2017-06-11

## 2017-06-11 RX ORDER — CARVEDILOL 6.25 MG/1
6.25 TABLET ORAL 2 TIMES DAILY
Qty: 60 TABLET | Refills: 0 | Status: SHIPPED | OUTPATIENT
Start: 2017-06-11

## 2017-06-11 RX ORDER — ATORVASTATIN CALCIUM 80 MG/1
80 TABLET, FILM COATED ORAL NIGHTLY
Qty: 30 TABLET | Refills: 0 | Status: SHIPPED | OUTPATIENT
Start: 2017-06-11

## 2017-06-11 RX ORDER — HYDROXYZINE HYDROCHLORIDE 25 MG/1
25 TABLET, FILM COATED ORAL EVERY 8 HOURS PRN
Qty: 90 TABLET | Refills: 0 | Status: SHIPPED | OUTPATIENT
Start: 2017-06-11 | End: 2017-06-11

## 2017-06-11 RX ORDER — ATORVASTATIN CALCIUM 80 MG/1
80 TABLET, FILM COATED ORAL NIGHTLY
Qty: 30 TABLET | Refills: 0 | Status: SHIPPED | OUTPATIENT
Start: 2017-06-11 | End: 2017-06-11

## 2017-06-11 RX ORDER — FOLIC ACID/VIT B COMPLEX AND C 0.8 MG
1 TABLET ORAL DAILY
Qty: 30 TABLET | Refills: 0 | Status: SHIPPED | OUTPATIENT
Start: 2017-06-11

## 2017-06-11 RX ORDER — NITROGLYCERIN 0.4 MG/1
0.4 TABLET SUBLINGUAL
Qty: 25 TABLET | Refills: 1 | Status: SHIPPED | OUTPATIENT
Start: 2017-06-11 | End: 2017-06-11

## 2017-06-11 RX ORDER — LISINOPRIL 10 MG/1
10 TABLET ORAL DAILY
Status: DISCONTINUED | OUTPATIENT
Start: 2017-06-11 | End: 2017-06-11 | Stop reason: HOSPADM

## 2017-06-11 RX ORDER — LANOLIN ALCOHOL/MO/W.PET/CERES
100 CREAM (GRAM) TOPICAL DAILY
Qty: 30 TABLET | Refills: 0 | Status: SHIPPED | OUTPATIENT
Start: 2017-06-11

## 2017-06-11 RX ORDER — LANOLIN ALCOHOL/MO/W.PET/CERES
100 CREAM (GRAM) TOPICAL DAILY
Qty: 30 TABLET | Refills: 0 | Status: SHIPPED | OUTPATIENT
Start: 2017-06-11 | End: 2017-06-11

## 2017-06-11 RX ORDER — HYDROXYZINE HYDROCHLORIDE 25 MG/1
25 TABLET, FILM COATED ORAL EVERY 8 HOURS PRN
Status: DISCONTINUED | OUTPATIENT
Start: 2017-06-11 | End: 2017-06-11 | Stop reason: HOSPADM

## 2017-06-11 RX ORDER — ONDANSETRON 4 MG/1
4 TABLET, FILM COATED ORAL EVERY 6 HOURS PRN
Qty: 10 TABLET | Refills: 0 | Status: SHIPPED | OUTPATIENT
Start: 2017-06-11

## 2017-06-11 RX ORDER — LISINOPRIL 10 MG/1
10 TABLET ORAL DAILY
Qty: 30 TABLET | Refills: 0 | Status: SHIPPED | OUTPATIENT
Start: 2017-06-11 | End: 2017-06-11

## 2017-06-11 RX ORDER — AMLODIPINE BESYLATE 10 MG/1
10 TABLET ORAL DAILY
Qty: 30 TABLET | Refills: 0 | Status: SHIPPED | OUTPATIENT
Start: 2017-06-11 | End: 2017-06-11

## 2017-06-11 RX ORDER — CLOPIDOGREL BISULFATE 75 MG/1
75 TABLET ORAL DAILY
Qty: 30 TABLET | Refills: 0 | Status: SHIPPED | OUTPATIENT
Start: 2017-06-11

## 2017-06-11 RX ORDER — CARVEDILOL 6.25 MG/1
6.25 TABLET ORAL 2 TIMES DAILY
Qty: 60 TABLET | Refills: 0 | Status: SHIPPED | OUTPATIENT
Start: 2017-06-11 | End: 2017-06-11

## 2017-06-11 RX ORDER — ASPIRIN 81 MG/1
81 TABLET ORAL DAILY
Qty: 30 TABLET | Refills: 0 | Status: SHIPPED | OUTPATIENT
Start: 2017-06-11

## 2017-06-11 RX ORDER — NITROGLYCERIN 0.4 MG/1
0.4 TABLET SUBLINGUAL
Qty: 25 TABLET | Refills: 1 | Status: SHIPPED | OUTPATIENT
Start: 2017-06-11

## 2017-06-11 RX ORDER — NITROGLYCERIN 0.4 MG/1
0.4 TABLET SUBLINGUAL
Status: DISCONTINUED | OUTPATIENT
Start: 2017-06-11 | End: 2017-06-11 | Stop reason: HOSPADM

## 2017-06-11 RX ORDER — HYDROXYZINE HYDROCHLORIDE 25 MG/1
25 TABLET, FILM COATED ORAL EVERY 8 HOURS PRN
Qty: 90 TABLET | Refills: 0 | Status: SHIPPED | OUTPATIENT
Start: 2017-06-11

## 2017-06-11 RX ORDER — ONDANSETRON 4 MG/1
4 TABLET, FILM COATED ORAL EVERY 6 HOURS PRN
Qty: 10 TABLET | Refills: 0 | Status: SHIPPED | OUTPATIENT
Start: 2017-06-11 | End: 2017-06-11

## 2017-06-11 RX ORDER — LISINOPRIL 10 MG/1
10 TABLET ORAL DAILY
Qty: 30 TABLET | Refills: 0 | Status: SHIPPED | OUTPATIENT
Start: 2017-06-11

## 2017-06-11 RX ORDER — FOLIC ACID/VIT B COMPLEX AND C 0.8 MG
1 TABLET ORAL DAILY
Qty: 30 TABLET | Refills: 0 | Status: SHIPPED | OUTPATIENT
Start: 2017-06-11 | End: 2017-06-11

## 2017-06-11 RX ADMIN — LISINOPRIL 10 MG: 10 TABLET ORAL at 13:28

## 2017-06-11 RX ADMIN — AMLODIPINE BESYLATE 10 MG: 10 TABLET ORAL at 08:39

## 2017-06-11 RX ADMIN — Medication 100 MG: at 08:39

## 2017-06-11 RX ADMIN — CARVEDILOL 6.25 MG: 6.25 TABLET, FILM COATED ORAL at 08:39

## 2017-06-11 RX ADMIN — Medication 1 TABLET: at 08:37

## 2017-06-11 RX ADMIN — HEPARIN SODIUM 5000 UNITS: 5000 INJECTION, SOLUTION INTRAVENOUS; SUBCUTANEOUS at 13:29

## 2017-06-11 RX ADMIN — HEPARIN SODIUM 5000 UNITS: 5000 INJECTION, SOLUTION INTRAVENOUS; SUBCUTANEOUS at 06:33

## 2017-06-11 RX ADMIN — ASPIRIN 81 MG: 81 TABLET, COATED ORAL at 08:39

## 2017-06-11 RX ADMIN — CLOPIDOGREL BISULFATE 75 MG: 75 TABLET ORAL at 08:39
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
## 2017-06-11 NOTE — PLAN OF CARE
Problem: Patient Care Overview (Adult)  Goal: Plan of Care Review  Outcome: Outcome(s) achieved Date Met:  06/11/17  Goal: Adult Individualization and Mutuality  Outcome: Outcome(s) achieved Date Met:  06/11/17  Goal: Discharge Needs Assessment  Outcome: Outcome(s) achieved Date Met:  06/11/17    Problem: Confusion, Acute (Adult)  Goal: Identify Related Risk Factors and Signs and Symptoms  Outcome: Outcome(s) achieved Date Met:  06/11/17  Goal: Cognitive/Functional Impairments Minimized  Outcome: Outcome(s) achieved Date Met:  06/11/17  Goal: Safety  Outcome: Outcome(s) achieved Date Met:  06/11/17    Problem: Pain, Acute (Adult)  Goal: Identify Related Risk Factors and Signs and Symptoms  Outcome: Outcome(s) achieved Date Met:  06/11/17  Goal: Acceptable Pain Control/Comfort Level  Outcome: Outcome(s) achieved Date Met:  06/11/17    Problem: Fall Risk (Adult)  Goal: Identify Related Risk Factors and Signs and Symptoms  Outcome: Outcome(s) achieved Date Met:  06/11/17  Goal: Absence of Falls  Outcome: Outcome(s) achieved Date Met:  06/11/17

## 2017-06-11 NOTE — PROGRESS NOTES
Continued Stay Note  [facility]     Patient Name: Mack Gamez  MRN: [id number]  Today's Date: 6/11/2017    Admit Date: 5/29/2017          Discharge Plan       06/11/17 1131    Case Management/Social Work Plan    Plan Home today with sig. other    Additional Comments Indigent meds arranged with our pharmacy, who will deliver the meds to his room. All scripts and indigent form was hand delivered to pharmacy by CM. $20 cash was left in an envelope in his room earlier this week for gas assistance. No other needs.              Discharge Codes     None        Expected Discharge Date and Time     Expected Discharge Date Expected Discharge Time    Jun 11, 2017             Arabella Ambrocio RN

## 2017-06-14 LAB
REF LAB TEST METHOD: NORMAL
SPECIMEN STATUS: NORMAL

## 2017-06-17 NOTE — SIGNIFICANT NOTE
Follow up note 6/17/17 at 12:30 PM    Lab work received from the [facility] Prion [facility]  - RT-QuIC (CSF) positive  - T-tau protein (CSF) 5488 (normal 0-1149)  - 14-3-3 protein (CSF) positive    The lab report indicates the estimated probability of prion disease in this patient is > 98%    Discussed findings with Neurology on Call: suggestion made to refer patient to a specialty center in the [place] Area ([facility])    Patient and family previously aware that prion disease (specifically Creutzfeld Mon disease) a possible diagnosis and that labs were pending at time of discharge.    I called Mr Gamez but no answer.    After multiple attempts, I was finally able to reach his fiance Anastasia, with whom the patient lives, at [phone].  I did inform her of the lab results, but importantly also stated that a final diagnosis can only be made by a Neurologist -- who after examining Mr Gamez and after reviewing labs and imaging can only then render an informed opinion as to whether the patient suffers from a prion disease.     The fiance states that Mr Gamez still suffers from mild to moderate confusion, and that recently he has been sleeping a lot. She has been attempting to find him a PCP near her home.    I did contact [facility] today (Saturday) - but will need to call Monday to refer Mr Gamez to that institution for further Neurology evaluation. The patient's fiance is agreeable to this plan and will provide transportation.    With patient's permission I will also call patient's son(s) in [place] to update them on the results and plan as above.      For completeness, I did obtain the patient's current address:    [address] [place] [address]  [address] [place] [id number]

## 2017-06-19 NOTE — SIGNIFICANT NOTE
Follow up note 6/19/17    After discussing with [facility], faxed Referral Request (along with a facesheet and all recent medical records - notes, labs, imaging reports) to [facility] Department of Neurology. Also included contact information for myself if there are further questions. Provided [facility] with contact numbers for both Mr Gamez and his Fihamida Oconnor. [facility] says they will call Mr Gamez and his fihamida with the appointment time and date in a couple of days after reviewing the transmitted records.    I did provide a brief update on this plan to both Mr Gamez and his emmie today by phone.     I also attempted to call his son today to update family on the recent test results and followup plan, but no answer so far.      Live Frazier MD  06/19/17  5:41 PM     Follow up note 6/21/17 9:15 PM    Discussed case with patient's son Isra by phone this evening (with patient's permission to discuss with his sons). 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    [facility] has declined referral, although I have left messages twice (yesterday and today) to further discuss Mr Gamez's case with the memory disorder Neurologist at that institution.  [facility]'s faxed response can be found with our scanned media records.    I discussed the case with our Neurologist to see if there are other options for evaluation of this patient in the [facility], and was subsequently provided with contact information for the [facility] Dept of Neurology. I will endeavor to obtain an appt for Mr Gamez at that facility.    If we are unable to obtain a local ([facility]) Neurology follow up appointment for Mr Gamez, he can always be seen back at this facility, although it seems best to minimize long  distance travel if possible.      Live Frazier MD  06/21/17  9:27 PM     Addendum 6/22/17 2:20 PM    Today I spoke with Dr Bean Louis, who is Director of the [facility], Department of Neurology, [facility].  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    I explained Mr Gamez's findings and the need for Neurology follow-up in the [place] area.  Dr Louis has kindly agreed to see and further evaluate the patient.     A disc with all pertinent imaging along with full patient records will be overnighted to Dr Louis's office ([address] [place] [address], [place] [address], [place] [address]; office phone [phone]; office fax [phone].    I have included contact numbers for:    Mr Gamez [phone]  Allie Oconnor (with whom patient lives) [phone]  Son Isra [phone]    As well as the patient's current home address:    [address] [place] [address]    I plan to call the gracie Oconnor this afternoon to update her on the plan after completing this documentation.     I have also provided Dr Louis with my cell phone number if I may be of further assistance.      Live Frazier MD  06/22/17  2:36 PM     Addendum 6/23/17 at 2:10 PM    Called by Dora, the Nurse for Dr Louis at the [facility] Dept of Neurology.    Dora's phone number is [phone]    Apparently Mr Gamez has Medicaid, and it is unclear if this insurance will be accepted in [place]. It appears to me that Mr Gamez is covered under [facility], that includes [place] in the area of service.    I discussed this issue today with our case manger Monie, gave her the phone number for Dr Louis's nurse above, and asked her to assist with ensuring Mr Gamez has insurance coverage for Neurologic care in [place].  Monie is agreeable to this plan.  Both Monie and Dr Louis's nurse have my phone number, and I indicated I am always  available and will assist them further if needed in any manner.      Live Frazier MD  06/23/17  2:19 PM     Addendum 6/28/17 at 3:06 PM    Received phone call from Dr Heriberto John's nurse at the [facility] Neurology Department ([phone]). She has been in contact with the patient's fiance, but insurance remains an issue to be resolved before the patient can be seen in [place]. I have been asked to see if we can obtain pre-authorization for at least one out of state visit to Dr Louis at his [place] office to both confirm the diagnosis as well as to provide planning and guidance for the patient, his fiance and family regarding prognosis.  I have contacted [place] Medicaid today and am actively working to obtain this pre-authorization.      Live Frazier MD  06/28/17  3:12 PM       Addendum 7/4/17 at 9:03 AM    Spoke with [facility] staff yesterday afternoon. It appears [place] Medicaid does not require pre-authorization for Neurology clinic appointment at [facility]. Our staff member personally contacted Dr Louis's assistant and Mr Gamez's fiance to update, hopefully with appointment to be scheduled soon.  I remain available to help as needed.      Live Frazier MD  07/04/17  9:07 AM

## 2017-06-26 PROBLEM — M25.559 CHRONIC HIP PAIN: Status: ACTIVE | Noted: 2017-06-26

## 2017-06-26 PROBLEM — L03.90 CELLULITIS: Status: ACTIVE | Noted: 2017-06-26

## 2017-06-26 PROBLEM — G89.29 CHRONIC HIP PAIN: Status: ACTIVE | Noted: 2017-06-26

## 2017-06-26 PROBLEM — M30.3 KAWASAKI'S DISEASE (HCC): Status: ACTIVE | Noted: 2017-06-26

## 2017-07-07 LAB — FUNGUS WND CULT: NORMAL

## 2017-10-18 RX ORDER — CLOPIDOGREL BISULFATE 75 MG/1
TABLET ORAL
Qty: 30 TABLET | Refills: 0 | Status: SHIPPED | OUTPATIENT
Start: 2017-10-18

## 2017-10-18 RX ORDER — FUROSEMIDE 20 MG/1
TABLET ORAL
Qty: 30 TABLET | Refills: 11 | OUTPATIENT
Start: 2017-10-18

## 2017-10-18 RX ORDER — ATORVASTATIN CALCIUM 80 MG/1
TABLET, FILM COATED ORAL
Qty: 30 TABLET | Refills: 0 | Status: SHIPPED | OUTPATIENT
Start: 2017-10-18

## 2017-11-06 ENCOUNTER — TELEPHONE (OUTPATIENT)
Dept: NEUROLOGY | Facility: CLINIC | Age: 62
End: 2017-11-06

## 2017-11-06 NOTE — TELEPHONE ENCOUNTER
----- Message from Ne Lizama sent at 2017 10:15 AM EST -----  Regarding: ROBY FOSTER  PATIENT'S SON, TING ZHANG, PHONE #[phone], CALLED, AND WAS WANTING TO TALK TO ROBY FOSTER.  HE INFORMED ME OF HIS FATHERS (PATIENTS) DEATH.  HE IS TRYING TO SETTLE AN INSURANCE DISPUTE FOR HIS DEATH.  HE IS LOOKING FOR A  DATE OF DIAGNOSIS, DEMENTIA?, AROUND , AND INFORMATION, ETC.  I TOLD HIM I WOULD CHECK WITH YOU, BUT I AM THINKING WE WOULD NEED A RELEASE FOR SUCH RECORDS.  THANKS I DID WILFREDO PATIENT AS .

## 2017-11-06 NOTE — TELEPHONE ENCOUNTER
Patient has never seen Olivia Martinez in the [facility] system. The patient was a patient of the [facility] where Ms. Martinez volunteers. [facility] called, advised the patient son that he would have to contact them for the records.

## 2018-11-29 NOTE — TELEPHONE ENCOUNTER
----- Message from Sepideh Cruz DNP, APRN sent at 4/13/2017 11:43 AM EDT -----  Regarding: RE: MED REFILL  Please refill all. Hydroxyzine 25 1 qid prn #[id number] NR  ----- Message -----     From: Ernestina Mcgill MA     Sent: 4/13/2017  11:21 AM       To: Sepideh Cruz DNP, APRN  Subject: FW: MED REFILL                                   I can refill the lisinopril and NG, but the hydroxyzine is no linger on current med list.  Please advise  ----- Message -----     From: Gabriela Hall     Sent: 4/13/2017  10:11 AM       To: Valencia García MA  Subject: MED REFILL                                       PATIENT NEEDS A REFILL ON NITROGLYCERIN AND LISINOPRIL AND A LITTLE GREEN PILL OF ANXIETY AND ITCHING. PATIENT USES PHARMACY ON FILE.       
Additional Safety/Bands:

## 2023-04-15 NOTE — PLAN OF CARE
Problem: Patient Care Overview (Adult)  Goal: Plan of Care Review  Outcome: Ongoing (interventions implemented as appropriate)    06/03/17 0423   Coping/Psychosocial Response Interventions   Plan Of Care Reviewed With patient   Patient Care Overview   Progress progress toward functional goals as expected   Outcome Evaluation   Outcome Summary/Follow up Plan Pt disoriented to place, situation. C/o occassional neck pain. Remains cooperative and appreciative. VSS. No s/s of distress         Problem: Confusion, Acute (Adult)  Goal: Identify Related Risk Factors and Signs and Symptoms  Outcome: Ongoing (interventions implemented as appropriate)  Goal: Cognitive/Functional Impairments Minimized  Outcome: Ongoing (interventions implemented as appropriate)  Goal: Safety  Outcome: Ongoing (interventions implemented as appropriate)    Problem: Pain, Acute (Adult)  Goal: Identify Related Risk Factors and Signs and Symptoms  Outcome: Ongoing (interventions implemented as appropriate)  Goal: Acceptable Pain Control/Comfort Level  Outcome: Ongoing (interventions implemented as appropriate)       DISCHARGE

## (undated) DEVICE — CATH DIAG EXPO .045 PIG 5F 100CM

## (undated) DEVICE — ST ACC MICROPUNCTURE .018 TRANSLSS/SS/TP 5F/10CM 21G/7CM

## (undated) DEVICE — RADIFOCUS GLIDEWIRE: Brand: GLIDEWIRE

## (undated) DEVICE — ST EXT IV SMARTSITE 2VLV SP M LL 5ML IV1

## (undated) DEVICE — DRSNG SURESITE123 4X4.8IN

## (undated) DEVICE — INTRO SHEATH ART/FEM ENGAGE .038 5F12CM

## (undated) DEVICE — ROTATING HEMOSTATIC VALVE .096": Brand: RHV

## (undated) DEVICE — LIMB HOLDERS: Brand: DEROYAL

## (undated) DEVICE — LN INJ CONTRST FLXCIL HP F/M LL 1200PSI48

## (undated) DEVICE — LEX NEURO ANGIOGRAPHY: Brand: MEDLINE INDUSTRIES, INC.

## (undated) DEVICE — CATH TEMPO 5F BER 100CM: Brand: TEMPO

## (undated) DEVICE — ST INF PRI SMRTSTE 20DRP 2VLV 24ML 117

## (undated) DEVICE — ANGIO-SEAL VIP VASCULAR CLOSURE DEVICE: Brand: ANGIO-SEAL